# Patient Record
Sex: FEMALE | Race: WHITE | NOT HISPANIC OR LATINO | URBAN - NONMETROPOLITAN AREA
[De-identification: names, ages, dates, MRNs, and addresses within clinical notes are randomized per-mention and may not be internally consistent; named-entity substitution may affect disease eponyms.]

---

## 2023-02-17 ENCOUNTER — DISEASE STATE MANAGEMENT VISIT (OUTPATIENT)
Dept: PHARMACY | Facility: HOSPITAL | Age: 33
End: 2023-02-17
Payer: MEDICAID

## 2023-02-17 VITALS
WEIGHT: 203.6 LBS | TEMPERATURE: 98.4 F | OXYGEN SATURATION: 98 % | DIASTOLIC BLOOD PRESSURE: 83 MMHG | SYSTOLIC BLOOD PRESSURE: 111 MMHG | HEART RATE: 99 BPM

## 2023-02-17 DIAGNOSIS — B18.2 CHRONIC HEPATITIS C WITHOUT HEPATIC COMA: Primary | ICD-10-CM

## 2023-02-17 DIAGNOSIS — R74.8 ELEVATED LIVER ENZYMES: ICD-10-CM

## 2023-02-17 DIAGNOSIS — F19.91 HISTORY OF ILLICIT DRUG USE: ICD-10-CM

## 2023-02-17 PROCEDURE — 99204 OFFICE O/P NEW MOD 45 MIN: CPT | Performed by: PHYSICIAN ASSISTANT

## 2023-02-17 RX ORDER — ALPRAZOLAM 1 MG/1
1 TABLET ORAL NIGHTLY PRN
COMMUNITY
Start: 2022-09-01

## 2023-02-17 RX ORDER — DEXTROAMPHETAMINE SACCHARATE, AMPHETAMINE ASPARTATE, DEXTROAMPHETAMINE SULFATE AND AMPHETAMINE SULFATE 5; 5; 5; 5 MG/1; MG/1; MG/1; MG/1
20 TABLET ORAL DAILY
COMMUNITY
Start: 2023-02-15

## 2023-02-17 RX ORDER — CARIPRAZINE 4.5 MG/1
4.5 CAPSULE, GELATIN COATED ORAL DAILY
COMMUNITY
Start: 2023-01-20

## 2023-02-17 NOTE — PROGRESS NOTES
New Patient Consult      Date: 2023   Patient Name: Selma Florez  MRN: [id number]  : 1990     Primary Care Provider: Provider, No Known  Referring Provider: Referring    Chief Complaint   Patient presents with   • Hepatitis C     Pt here for initial Hep C eval     History of Present Illness: Selma Florez is a 32 y.o. female who is here today as a new patient (self-referral) with Gastroenterology for evaluation of Hepatitis C.    She found out about having Hepatitis C infection approx 3 years ago. She has not had prior treatment for hepatitis. Reports no known personal history of liver disease including other viral hepatitis. There is no known family history of liver disease or cirrhosis. She admits to previous IVDU and intranasal drug use. Has not used IV drugs in years. Recently did use cocaine and candido, intranasal which she does not do often. She does have nonprofessional tattoos. Admits to having previous alcoholism, drank very heavy daily for a few months. She does currently drink alcohol, drinks 1-3 beers occasionally. She does use marijuana intermittently. No recent liver imaging. She has not had recent labs. She has not had previous vaccinations for Hepatitis A and B. She is working full time and living with friends. She does have 2 children and her ex  has full custody.     Subjective      Past Medical History:   Diagnosis Date   • Coma (HCC)      or      Past Surgical History:   Procedure Laterality Date   • APPENDECTOMY     • ENDOMETRIAL ABLATION      age 14   • VAGINAL HYSTERECTOMY      2019     Family History   Problem Relation Age of Onset   • Breast cancer Mother    • Heart disease Father      Social History     Socioeconomic History   • Marital status:    Tobacco Use   • Smoking status: Never   • Smokeless tobacco: Never   Vaping Use   • Vaping Use: Every day   • Substances: Nicotine, THC, Flavoring   • Devices: Disposable   Substance  and Sexual Activity   • Alcohol use: Yes     Comment: 1-3 drinks about 3-4 times a week   • Drug use: Yes     Types: Cocaine(coke), Marijuana     Comment: Montserrat - occassional use   • Sexual activity: Defer     Current Outpatient Medications:   •  ALPRAZolam (Xanax) 1 MG tablet, Take 1 mg by mouth At Night As Needed. 3-4 times week, Disp: , Rfl:   •  amphetamine-dextroamphetamine (ADDERALL) 20 MG tablet, Take 20 mg by mouth Daily., Disp: , Rfl:   •  Vraylar 4.5 MG capsule, Take 4.5 mg by mouth Daily., Disp: , Rfl:   •  CRANBERRY PO, Take 1 capsule by mouth Daily., Disp: , Rfl:   •  Multiple Vitamins-Minerals (WOMENS MULTI PO), Take 1 tablet by mouth Daily., Disp: , Rfl:     No Known Allergies    The following portions of the patient's history were reviewed and updated as appropriate: allergies, current medications, past family history, past medical history, past social history, past surgical history and problem list.    Objective     Physical Exam  Vitals reviewed.   Constitutional:       General: She is not in acute distress.     Appearance: Normal appearance. She is well-developed. She is not ill-appearing or diaphoretic.   HENT:      Head: Normocephalic and atraumatic.      Right Ear: External ear normal.      Left Ear: External ear normal.      Nose: Nose normal.      Mouth/Throat:      Comments: Wearing a mask  Eyes:      General: No scleral icterus.        Right eye: No discharge.         Left eye: No discharge.      Conjunctiva/sclera: Conjunctivae normal.   Neck:      Vascular: No JVD.   Cardiovascular:      Rate and Rhythm: Normal rate and regular rhythm.      Heart sounds: Normal heart sounds. No murmur heard.    No friction rub. No gallop.   Pulmonary:      Effort: Pulmonary effort is normal. No respiratory distress.      Breath sounds: Normal breath sounds. No wheezing or rales.   Chest:      Chest wall: No tenderness.   Abdominal:      General: Bowel sounds are normal. There is no distension  (generalized, mild).      Palpations: Abdomen is soft. There is no mass.      Tenderness: There is abdominal tenderness. There is no guarding.   Musculoskeletal:         General: No deformity. Normal range of motion.      Cervical back: Normal range of motion.   Skin:     General: Skin is warm and dry.      Findings: No erythema or rash.      Comments: Tattoos, piercings   Neurological:      Mental Status: She is alert and oriented to person, place, and time.      Coordination: Coordination normal.   Psychiatric:         Mood and Affect: Mood normal.         Behavior: Behavior normal.         Thought Content: Thought content normal.         Judgment: Judgment normal.       Vitals:    02/17/23 0900   BP: 111/83   Pulse: 99   Temp: 98.4 °F (36.9 °C)   SpO2: 98%   Weight: 92.4 kg (203 lb 9.6 oz)     Results Review:   I have reviewed the patient's new clinical and imaging results.    Labs 6/2022: Hep C antibody positive, Hep B surf antigen negative, HCV RNA quant 3,646,353, , , alk phos 78, bili 0.8, Hgb 15.1, , platelets 257,000.     CTAP 2015 normal liver    Assessment / Plan      1. Chronic hepatitis C without hepatic coma  2. Elevated liver enzymes  She has chronic Hepatitis C infection, genotype unknown, treatment naive, without suspected cirrhosis. Labs in June 2022 showed moderately elevated liver enzymes. She will need to have these labs re-checked and expect liver enzymes to improve after Hepatitis C treatment. If they continue to be elevated, may need further liver workup with more labs after treatment.     More recommendations will be made after these results have been reviewed. She will abstain from alcohol and illegal drugs. She will have US liver to determine if any lesions or other liver diseases present. Immunity to Hep A and B will be determined and vaccinations recommended if needed. After review of these results and discussion with with the patient, we will proceed with Hep C  therapy and will submit Rx to insurance company for approval. Treatment will depend on fibrosis score and Hep C genotype. When approved, she will  Rx from our pharmacy and start taking exactly as directed. Hep C viral load lab (HCV RNA quant) and CMP will be checked 4 weeks after start of therapy, at end of therapy and 3 months s/p therapy to determine response to treatment and cure. She will call with concerns.     - CBC (No Diff); Future  - Comprehensive Metabolic Panel; Future  - hCG, Serum, Qualitative; Future  - HCV FibroSURE; Future  - HCV RNA By PCR, Qn Rfx [place]; Future  - Hepatitis A Antibody, Total; Future  - Hepatitis B Core Antibody, Total; Future  - Hepatitis B Surface Antibody; Future  - Hepatitis B Surface Antigen; Future  - HIV-1 / O / 2 Ag / Antibody 4th Generation; Future  - Protime-INR; Future    - US Liver; Future    3. History of illicit drug use  She admits previous and current illicit drug use. Has not used IV drugs in years per her report. She does intermittently continue to use drugs intranasally but not often. She understands that continuing these risky behaviors puts her at risk for reinfection with Hepatitis C even after treatment. She complete UDS prior to initiation of any Hepatitis C treatment, will have UDS when she is planning to proceed with treatment.     - Urine Drug Screen - Urine, Clean Catch; Future              Follow Up:   Return in about 6 weeks (around 3/31/2023) for discussion of results.      Yulia Lundberg PA-C  [facility]  2/17/2023  11:25 EST    Dictated Utilizing Dragon Dictation: Part of this note may be an electronic transcription/translation of spoken language to printed text using the Dragon Dictation System.

## 2024-09-06 ENCOUNTER — TELEPHONE (OUTPATIENT)
Dept: GASTROENTEROLOGY | Facility: CLINIC | Age: 34
End: 2024-09-06
Payer: COMMERCIAL

## 2024-09-06 NOTE — TELEPHONE ENCOUNTER
Hub staff attempted to follow warm transfer process and was unsuccessful     Caller: Selma Florez    Relationship to patient: Self    Best call back number: [phone]    Patient is needing: PT CALLED STATING THAT SHE NEEDS AN APPT FOR THE REMAINDER OF HER HEP C TREATMENT// PLEASE CALL PT BACK

## 2024-10-12 ENCOUNTER — OFFICE VISIT (OUTPATIENT)
Dept: INTERNAL MEDICINE | Facility: CLINIC | Age: 34
End: 2024-10-12
Payer: COMMERCIAL

## 2024-10-12 VITALS
OXYGEN SATURATION: 100 % | RESPIRATION RATE: 20 BRPM | WEIGHT: 223 LBS | HEIGHT: 66 IN | SYSTOLIC BLOOD PRESSURE: 114 MMHG | TEMPERATURE: 98.2 F | HEART RATE: 88 BPM | DIASTOLIC BLOOD PRESSURE: 81 MMHG | BODY MASS INDEX: 35.84 KG/M2

## 2024-10-12 DIAGNOSIS — F42.9 OBSESSIVE-COMPULSIVE DISORDER, UNSPECIFIED TYPE: ICD-10-CM

## 2024-10-12 DIAGNOSIS — R53.83 FATIGUE, UNSPECIFIED TYPE: ICD-10-CM

## 2024-10-12 DIAGNOSIS — L73.9 FOLLICULITIS: ICD-10-CM

## 2024-10-12 DIAGNOSIS — Z72.0 TOBACCO USE: ICD-10-CM

## 2024-10-12 DIAGNOSIS — Z76.89 ENCOUNTER TO ESTABLISH CARE: ICD-10-CM

## 2024-10-12 DIAGNOSIS — F32.A CHRONIC DEPRESSION: ICD-10-CM

## 2024-10-12 DIAGNOSIS — F41.0 PANIC DISORDER: ICD-10-CM

## 2024-10-12 DIAGNOSIS — Z13.29 SCREENING FOR ENDOCRINE, METABOLIC AND IMMUNITY DISORDER: ICD-10-CM

## 2024-10-12 DIAGNOSIS — Z13.220 SCREENING FOR CHOLESTEROL LEVEL: ICD-10-CM

## 2024-10-12 DIAGNOSIS — L60.8 TOENAIL DEFORMITY: ICD-10-CM

## 2024-10-12 DIAGNOSIS — N60.12 FIBROCYSTIC BREAST CHANGES, BILATERAL: ICD-10-CM

## 2024-10-12 DIAGNOSIS — N60.11 FIBROCYSTIC BREAST CHANGES, BILATERAL: ICD-10-CM

## 2024-10-12 DIAGNOSIS — Z13.0 SCREENING FOR ENDOCRINE, METABOLIC AND IMMUNITY DISORDER: ICD-10-CM

## 2024-10-12 DIAGNOSIS — Z12.72 ENCOUNTER FOR PAPANICOLAOU SMEAR OF VAGINA: ICD-10-CM

## 2024-10-12 DIAGNOSIS — K59.09 CHRONIC CONSTIPATION: ICD-10-CM

## 2024-10-12 DIAGNOSIS — J45.40 MODERATE PERSISTENT ASTHMA WITHOUT COMPLICATION: ICD-10-CM

## 2024-10-12 DIAGNOSIS — Z13.0 SCREENING FOR DEFICIENCY ANEMIA: ICD-10-CM

## 2024-10-12 DIAGNOSIS — Z80.3 FAMILY HISTORY OF BREAST CANCER: ICD-10-CM

## 2024-10-12 DIAGNOSIS — Z00.00 ANNUAL PHYSICAL EXAM: Primary | ICD-10-CM

## 2024-10-12 DIAGNOSIS — Z13.228 SCREENING FOR ENDOCRINE, METABOLIC AND IMMUNITY DISORDER: ICD-10-CM

## 2024-10-12 DIAGNOSIS — F90.2 ATTENTION DEFICIT HYPERACTIVITY DISORDER (ADHD), COMBINED TYPE: ICD-10-CM

## 2024-10-12 DIAGNOSIS — Z90.711 HISTORY OF PARTIAL HYSTERECTOMY: ICD-10-CM

## 2024-10-12 RX ORDER — LISDEXAMFETAMINE DIMESYLATE 60 MG/1
60 CAPSULE ORAL EVERY MORNING
COMMUNITY

## 2024-10-12 RX ORDER — MONTELUKAST SODIUM 10 MG/1
10 TABLET ORAL NIGHTLY
Qty: 90 TABLET | Refills: 3 | Status: SHIPPED | OUTPATIENT
Start: 2024-10-12

## 2024-10-12 RX ORDER — ALBUTEROL SULFATE 90 UG/1
2 INHALANT RESPIRATORY (INHALATION) EVERY 6 HOURS PRN
Qty: 18 G | Refills: 5 | Status: SHIPPED | OUTPATIENT
Start: 2024-10-12

## 2024-10-12 RX ORDER — MUPIROCIN 20 MG/G
1 OINTMENT TOPICAL 2 TIMES DAILY
Qty: 30 G | Refills: 0 | Status: SHIPPED | OUTPATIENT
Start: 2024-10-12

## 2024-10-12 RX ORDER — FLUTICASONE PROPIONATE AND SALMETEROL 100; 50 UG/1; UG/1
1 POWDER RESPIRATORY (INHALATION)
Qty: 60 EACH | Refills: 5 | Status: SHIPPED | OUTPATIENT
Start: 2024-10-12

## 2024-10-12 RX ORDER — CHLORHEXIDINE GLUCONATE 40 MG/ML
1 SOLUTION TOPICAL DAILY PRN
Qty: 3785 ML | Refills: 5 | Status: SHIPPED | OUTPATIENT
Start: 2024-10-12

## 2024-10-12 RX ORDER — NALTREXONE 380 MG
380 KIT INTRAMUSCULAR
COMMUNITY
Start: 2024-09-06
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
## 2024-10-16 LAB — REF LAB TEST METHOD: NORMAL

## 2024-10-17 ENCOUNTER — PRIOR AUTHORIZATION (OUTPATIENT)
Dept: INTERNAL MEDICINE | Facility: CLINIC | Age: 34
End: 2024-10-17
Payer: COMMERCIAL

## 2024-10-17 DIAGNOSIS — K58.1 IRRITABLE BOWEL SYNDROME WITH CONSTIPATION: ICD-10-CM

## 2024-10-17 DIAGNOSIS — K59.09 CHRONIC CONSTIPATION: Primary | ICD-10-CM

## 2024-10-17 RX ORDER — PLECANATIDE 3 MG/1
3 TABLET ORAL DAILY
Qty: 30 TABLET | Refills: 0 | Status: SHIPPED | OUTPATIENT
Start: 2024-10-17

## 2024-10-17 NOTE — TELEPHONE ENCOUNTER
PA submitted via CoverMyMeds on Linzess 72 MCG. (Key: [id number])      Has tried:    Miralax  Metamucil  Dulcolax   Magnesium citrate  Suppositories

## 2024-10-18 ENCOUNTER — PATIENT MESSAGE (OUTPATIENT)
Dept: INTERNAL MEDICINE | Facility: CLINIC | Age: 34
End: 2024-10-18
Payer: COMMERCIAL

## 2024-10-18 DIAGNOSIS — F19.10 SUBSTANCE ABUSE: Primary | ICD-10-CM

## 2024-10-18 DIAGNOSIS — Z02.83 ENCOUNTER FOR DRUG SCREENING: ICD-10-CM

## 2024-10-18 DIAGNOSIS — F19.11 HISTORY OF DRUG ABUSE: ICD-10-CM

## 2024-10-21 DIAGNOSIS — F19.11 HISTORY OF DRUG ABUSE: Primary | ICD-10-CM

## 2024-10-21 DIAGNOSIS — Z51.81 MEDICATION MONITORING ENCOUNTER: ICD-10-CM

## 2024-10-22 ENCOUNTER — CLINICAL SUPPORT (OUTPATIENT)
Dept: INTERNAL MEDICINE | Facility: CLINIC | Age: 34
End: 2024-10-22
Payer: COMMERCIAL

## 2024-10-22 DIAGNOSIS — F19.10 SUBSTANCE ABUSE: ICD-10-CM

## 2024-10-22 DIAGNOSIS — F19.11 HISTORY OF DRUG ABUSE: ICD-10-CM

## 2024-10-22 DIAGNOSIS — Z02.83 ENCOUNTER FOR DRUG SCREENING: ICD-10-CM

## 2024-10-23 LAB
25(OH)D3+25(OH)D2 SERPL-MCNC: 61.7 NG/ML (ref 30–100)
ALBUMIN SERPL-MCNC: 4 G/DL (ref 3.5–5.2)
ALBUMIN/GLOB SERPL: 1.3 G/DL
ALP SERPL-CCNC: 100 U/L (ref 39–117)
ALT SERPL-CCNC: 191 U/L (ref 1–33)
AMPHETAMINES UR QL SCN: POSITIVE NG/ML
AST SERPL-CCNC: 119 U/L (ref 1–32)
BARBITURATES UR QL SCN: NEGATIVE NG/ML
BENZODIAZ UR QL SCN: POSITIVE NG/ML
BILIRUB SERPL-MCNC: 0.4 MG/DL (ref 0–1.2)
BUN SERPL-MCNC: 9 MG/DL (ref 6–20)
BUN/CREAT SERPL: 11.5 (ref 7–25)
BZE UR QL SCN: NEGATIVE NG/ML
CALCIUM SERPL-MCNC: 9 MG/DL (ref 8.6–10.5)
CANNABINOIDS UR QL SCN: NEGATIVE NG/ML
CHLORIDE SERPL-SCNC: 102 MMOL/L (ref 98–107)
CHOLEST SERPL-MCNC: 146 MG/DL (ref 0–200)
CO2 SERPL-SCNC: 21.1 MMOL/L (ref 22–29)
CREAT SERPL-MCNC: 0.78 MG/DL (ref 0.57–1)
CREAT UR-MCNC: 20 MG/DL (ref 20–300)
EGFRCR SERPLBLD CKD-EPI 2021: 102.4 ML/MIN/1.73
ERYTHROCYTE [DISTWIDTH] IN BLOOD BY AUTOMATED COUNT: 11.7 % (ref 12.3–15.4)
FOLATE SERPL-MCNC: 18.8 NG/ML (ref 4.78–24.2)
GLOBULIN SER CALC-MCNC: 3 GM/DL
GLUCOSE SERPL-MCNC: 74 MG/DL (ref 65–99)
HBA1C MFR BLD: 4.8 % (ref 4.8–5.6)
HCT VFR BLD AUTO: 40.9 % (ref 34–46.6)
HDLC SERPL-MCNC: 38 MG/DL (ref 40–60)
HGB BLD-MCNC: 13.9 G/DL (ref 12–15.9)
LABORATORY COMMENT REPORT: ABNORMAL
LDLC SERPL CALC-MCNC: 85 MG/DL (ref 0–100)
MCH RBC QN AUTO: 32.6 PG (ref 26.6–33)
MCHC RBC AUTO-ENTMCNC: 34 G/DL (ref 31.5–35.7)
MCV RBC AUTO: 96 FL (ref 79–97)
METHADONE UR QL SCN: NEGATIVE NG/ML
OPIATES UR QL SCN: NEGATIVE NG/ML
OXYCODONE+OXYMORPHONE UR QL SCN: NEGATIVE NG/ML
PCP UR QL: NEGATIVE NG/ML
PH UR: 5.7 [PH] (ref 4.5–8.9)
PLATELET # BLD AUTO: 246 10*3/MM3 (ref 140–450)
POTASSIUM SERPL-SCNC: 4.2 MMOL/L (ref 3.5–5.2)
PROPOXYPH UR QL SCN: NEGATIVE NG/ML
PROT SERPL-MCNC: 7 G/DL (ref 6–8.5)
RBC # BLD AUTO: 4.26 10*6/MM3 (ref 3.77–5.28)
SODIUM SERPL-SCNC: 139 MMOL/L (ref 136–145)
TRIGL SERPL-MCNC: 127 MG/DL (ref 0–150)
TSH SERPL DL<=0.005 MIU/L-ACNC: 1.8 UIU/ML (ref 0.27–4.2)
VIT B12 SERPL-MCNC: 471 PG/ML (ref 211–946)
VLDLC SERPL CALC-MCNC: 23 MG/DL (ref 5–40)
WBC # BLD AUTO: 6.79 10*3/MM3 (ref 3.4–10.8)

## 2024-10-24 DIAGNOSIS — B18.2 CHRONIC HEPATITIS C WITHOUT HEPATIC COMA: Primary | ICD-10-CM

## 2024-10-31 ENCOUNTER — CLINICAL SUPPORT (OUTPATIENT)
Dept: INTERNAL MEDICINE | Facility: CLINIC | Age: 34
End: 2024-10-31
Payer: COMMERCIAL

## 2024-11-01 LAB
AMPHETAMINES UR QL SCN: POSITIVE NG/ML
BARBITURATES UR QL SCN: NEGATIVE NG/ML
BENZODIAZ UR QL SCN: POSITIVE NG/ML
BZE UR QL SCN: NEGATIVE NG/ML
CANNABINOIDS UR QL SCN: NEGATIVE NG/ML
CREAT UR-MCNC: 35.5 MG/DL (ref 20–300)
LABORATORY COMMENT REPORT: ABNORMAL
METHADONE UR QL SCN: NEGATIVE NG/ML
OPIATES UR QL SCN: NEGATIVE NG/ML
OXYCODONE+OXYMORPHONE UR QL SCN: NEGATIVE NG/ML
PCP UR QL: NEGATIVE NG/ML
PH UR: 5.7 [PH] (ref 4.5–8.9)
PROPOXYPH UR QL SCN: NEGATIVE NG/ML

## 2024-11-07 ENCOUNTER — CLINICAL SUPPORT (OUTPATIENT)
Dept: INTERNAL MEDICINE | Facility: CLINIC | Age: 34
End: 2024-11-07
Payer: COMMERCIAL

## 2024-11-08 ENCOUNTER — LAB (OUTPATIENT)
Dept: LAB | Facility: HOSPITAL | Age: 34
End: 2024-11-08
Payer: COMMERCIAL

## 2024-11-08 ENCOUNTER — SPECIALTY PHARMACY (OUTPATIENT)
Dept: PHARMACY | Facility: HOSPITAL | Age: 34
End: 2024-11-08
Payer: COMMERCIAL

## 2024-11-08 VITALS
WEIGHT: 231 LBS | SYSTOLIC BLOOD PRESSURE: 130 MMHG | DIASTOLIC BLOOD PRESSURE: 89 MMHG | BODY MASS INDEX: 37.28 KG/M2 | OXYGEN SATURATION: 96 % | HEART RATE: 95 BPM

## 2024-11-08 DIAGNOSIS — R74.8 ELEVATED LIVER ENZYMES: ICD-10-CM

## 2024-11-08 DIAGNOSIS — B18.2 CHRONIC HEPATITIS C WITHOUT HEPATIC COMA: ICD-10-CM

## 2024-11-08 DIAGNOSIS — F19.91 HISTORY OF ILLICIT DRUG USE: ICD-10-CM

## 2024-11-08 DIAGNOSIS — B18.2 CHRONIC HEPATITIS C WITHOUT HEPATIC COMA: Primary | ICD-10-CM

## 2024-11-08 LAB
ALBUMIN SERPL-MCNC: 4.2 G/DL (ref 3.5–5.2)
ALBUMIN/GLOB SERPL: 1.4 G/DL
ALP SERPL-CCNC: 84 U/L (ref 39–117)
ALT SERPL W P-5'-P-CCNC: 232 U/L (ref 1–33)
AMPHETAMINES UR QL SCN: POSITIVE NG/ML
ANION GAP SERPL CALCULATED.3IONS-SCNC: 11.5 MMOL/L (ref 5–15)
AST SERPL-CCNC: 111 U/L (ref 1–32)
BARBITURATES UR QL SCN: NEGATIVE NG/ML
BENZODIAZ UR QL SCN: POSITIVE NG/ML
BILIRUB SERPL-MCNC: 0.3 MG/DL (ref 0–1.2)
BUN SERPL-MCNC: 12 MG/DL (ref 6–20)
BUN/CREAT SERPL: 14.8 (ref 7–25)
BZE UR QL SCN: NEGATIVE NG/ML
CALCIUM SPEC-SCNC: 10.6 MG/DL (ref 8.6–10.5)
CANNABINOIDS UR QL SCN: NEGATIVE NG/ML
CHLORIDE SERPL-SCNC: 102 MMOL/L (ref 98–107)
CO2 SERPL-SCNC: 25.5 MMOL/L (ref 22–29)
CREAT SERPL-MCNC: 0.81 MG/DL (ref 0.57–1)
CREAT UR-MCNC: 42.2 MG/DL (ref 20–300)
EGFRCR SERPLBLD CKD-EPI 2021: 97.8 ML/MIN/1.73
GLOBULIN UR ELPH-MCNC: 2.9 GM/DL
GLUCOSE SERPL-MCNC: 83 MG/DL (ref 65–99)
HBV SURFACE AB SER RIA-ACNC: NORMAL
HBV SURFACE AG SERPL QL IA: NORMAL
HIV 1+2 AB+HIV1P24 AG SERPLBLD IA.RAPID: NORMAL
HIV 1+2 AB+HIV1P24 AG SERPLBLD IA.RAPID: NORMAL
INR PPP: 0.89 (ref 0.9–1.1)
LABORATORY COMMENT REPORT: ABNORMAL
METHADONE UR QL SCN: NEGATIVE NG/ML
OPIATES UR QL SCN: NEGATIVE NG/ML
OXYCODONE+OXYMORPHONE UR QL SCN: NEGATIVE NG/ML
PCP UR QL: NEGATIVE NG/ML
PH UR: 6.6 [PH] (ref 4.5–8.9)
POTASSIUM SERPL-SCNC: 4.7 MMOL/L (ref 3.5–5.2)
PROPOXYPH UR QL SCN: NEGATIVE NG/ML
PROT SERPL-MCNC: 7.1 G/DL (ref 6–8.5)
PROTHROMBIN TIME: 12.5 SECONDS (ref 12.3–15.1)
SODIUM SERPL-SCNC: 139 MMOL/L (ref 136–145)

## 2024-11-08 PROCEDURE — 85610 PROTHROMBIN TIME: CPT

## 2024-11-08 PROCEDURE — 87522 HEPATITIS C REVRS TRNSCRPJ: CPT

## 2024-11-08 PROCEDURE — 87340 HEPATITIS B SURFACE AG IA: CPT

## 2024-11-08 PROCEDURE — 86706 HEP B SURFACE ANTIBODY: CPT

## 2024-11-08 PROCEDURE — 36415 COLL VENOUS BLD VENIPUNCTURE: CPT

## 2024-11-08 PROCEDURE — 86708 HEPATITIS A ANTIBODY: CPT

## 2024-11-08 PROCEDURE — G0475 HIV COMBINATION ASSAY: HCPCS

## 2024-11-08 PROCEDURE — 87902 NFCT AGT GNTYP ALYS HEP C: CPT

## 2024-11-08 PROCEDURE — 86704 HEP B CORE ANTIBODY TOTAL: CPT

## 2024-11-08 PROCEDURE — G0463 HOSPITAL OUTPT CLINIC VISIT: HCPCS

## 2024-11-08 PROCEDURE — 80053 COMPREHEN METABOLIC PANEL: CPT

## 2024-11-08 NOTE — LETTER
2024     TRISTON Casillas  [address]  [facility] [address]    Patient: Selma Florez   YOB: 1990   Date of Visit: 2024       Dear TRISTON Casillas    Selma Florez was in my office today. Below is a copy of my note.    If you have questions, please do not hesitate to call me. I look forward to following Selma along with you.         Sincerely,        [facility]        CC: No Recipients         Follow Up Note     Date: 2024   Patient Name: Selma Florez  MRN: [id number]  : 1990     Primary Care Provider: Porsche Talavera APRN     Chief Complaint   Patient presents with   • Hepatitis C     Pt here for initial Hep C eval     History of present illness:   2024  Selma Florez is a 34 y.o. female who is here today as an established GI patient regarding Hepatitis C.    She started Hep C treatment with Epclusa from Aug 2024 and took the medication for 5 weeks. She was not able to complete treatment when she left the rehab. She had severe migraines and vomiting during the treatment. Has been clean now since 2024. She is on vivotrol now.     Feeling well today without GI complaints. Takes Linzess 72 mcg once daily for constipation with good improvements.     Interval History:  2023  She found out about having Hepatitis C infection approx 3 years ago. She has not had prior treatment for hepatitis. Reports no known personal history of liver disease including other viral hepatitis. There is no known family history of liver disease or cirrhosis. She admits to previous IVDU and intranasal drug use. Has not used IV drugs in years. Recently did use cocaine and candido, intranasal which she does not do often. She does have nonprofessional tattoos. Admits to having previous alcoholism, drank very heavy daily for a few months. She does currently drink alcohol, drinks 1-3 beers occasionally. She does use  marijuana intermittently. No recent liver imaging. She has not had recent labs. She has not had previous vaccinations for Hepatitis A and B. She is working full time and living with friends. She does have 2 children and her ex  has full custody.     Subjective     Past Medical History:   Diagnosis Date   • ADHD    • Coma     2020 or 2021   • Hepatitis C    • OCD (obsessive compulsive disorder)    • Panic disorder      Past Surgical History:   Procedure Laterality Date   • APPENDECTOMY     • ENDOMETRIAL ABLATION      age 14   • VAGINAL HYSTERECTOMY      2019     Family History   Problem Relation Age of Onset   • Breast cancer Mother    • Heart disease Father      Social History     Socioeconomic History   • Marital status:    Tobacco Use   • Smoking status: Every Day     Types: Cigarettes   • Smokeless tobacco: Never   • Tobacco comments:     1-2 cigarettes a day   Vaping Use   • Vaping status: Some Days   • Substances: Nicotine, Flavoring   • Devices: Disposable   Substance and Sexual Activity   • Alcohol use: Not Currently   • Drug use: Not Currently     Types: Cocaine(coke), Marijuana, Methamphetamines     Comment: Montserrat - occassional use   • Sexual activity: Defer     Current Outpatient Medications:   •  albuterol sulfate  (90 Base) MCG/ACT inhaler, Inhale 2 puffs Every 6 (Six) Hours As Needed for Wheezing or Shortness of Air., Disp: 18 g, Rfl: 5  •  ALPRAZolam (Xanax) 1 MG tablet, Take 1 tablet by mouth At Night As Needed. 3-4 times week (Patient taking differently: Take 1 tablet by mouth 3 times a day.), Disp: , Rfl:   •  amphetamine-dextroamphetamine (ADDERALL) 20 MG tablet, Take 1 tablet by mouth Daily., Disp: , Rfl:   •  Chlorhexidine Gluconate 4 % solution, Apply 1 Application topically Daily As Needed (folliculitis)., Disp: 3785 mL, Rfl: 5  •  Fluticasone-Salmeterol (Advair Diskus) 100-50 MCG/ACT DISKUS, Inhale 1 puff 2 (Two) Times a Day., Disp: 60 each, Rfl: 5  •  lisdexamfetamine  (Vyvanse) 60 MG capsule, Take 1 capsule by mouth Every Morning, Disp: , Rfl:   •  Multiple Vitamins-Minerals (WOMENS MULTI PO), Take 1 tablet by mouth Daily., Disp: , Rfl:   •  Vivitrol 380 MG reconstituted suspension IM suspension, Inject 380 mg into the appropriate muscle as directed by prescriber Every 30 (Thirty) Days., Disp: , Rfl:   •  Vraylar 4.5 MG capsule, Take 1 capsule by mouth Daily. (Patient taking differently: Take 1.5 mg by mouth Daily.), Disp: , Rfl:   •  CRANBERRY PO, Take 1 capsule by mouth Daily. (Patient not taking: Reported on 11/8/2024), Disp: , Rfl:   •  linaclotide (LINZESS) 72 MCG capsule capsule, Take 1 capsule by mouth Every Morning Before Breakfast., Disp: 30 capsule, Rfl: 0    No Known Allergies    The following portions of the patient's history were reviewed and updated as appropriate: allergies, current medications, past family history, past medical history, past social history, past surgical history and problem list.    Objective     Physical Exam  Constitutional:       General: She is not in acute distress.     Appearance: Normal appearance. She is well-developed. She is not diaphoretic.   HENT:      Head: Normocephalic and atraumatic.      Right Ear: External ear normal.      Left Ear: External ear normal.      Nose: Nose normal.   Eyes:      General: No scleral icterus.        Right eye: No discharge.         Left eye: No discharge.      Conjunctiva/sclera: Conjunctivae normal.   Neck:      Trachea: No tracheal deviation.   Pulmonary:      Effort: Pulmonary effort is normal. No respiratory distress.   Musculoskeletal:         General: Normal range of motion.      Cervical back: Normal range of motion.   Skin:     Coloration: Skin is not pale.      Findings: No erythema or rash.   Neurological:      Mental Status: She is alert and oriented to person, place, and time.      Coordination: Coordination normal.   Psychiatric:         Mood and Affect: Mood normal.         Behavior:  Behavior normal.         Thought Content: Thought content normal.         Judgment: Judgment normal.       Vitals:    11/08/24 0952   BP: 130/89   Pulse: 95   SpO2: 96%   Weight: 105 kg (231 lb)     Results Review:   I reviewed the patient's new clinical results.    Office Visit on 10/12/2024   Component Date Value Ref Range Status   • WBC 10/22/2024 6.79  3.40 - 10.80 10*3/mm3 Final   • RBC 10/22/2024 4.26  3.77 - 5.28 10*6/mm3 Final   • Hemoglobin 10/22/2024 13.9  12.0 - 15.9 g/dL Final   • Hematocrit 10/22/2024 40.9  34.0 - 46.6 % Final   • MCV 10/22/2024 96.0  79.0 - 97.0 fL Final   • MCH 10/22/2024 32.6  26.6 - 33.0 pg Final   • MCHC 10/22/2024 34.0  31.5 - 35.7 g/dL Final   • RDW 10/22/2024 11.7 (L)  12.3 - 15.4 % Final   • Platelets 10/22/2024 246  140 - 450 10*3/mm3 Final   • Glucose 10/22/2024 74  65 - 99 mg/dL Final   • BUN 10/22/2024 9  6 - 20 mg/dL Final   • Creatinine 10/22/2024 0.78  0.57 - 1.00 mg/dL Final   • EGFR Result 10/22/2024 102.4  >60.0 mL/min/1.73 Final    Comment: GFR Normal >60  Chronic Kidney Disease <60  Kidney Failure <15     • BUN/Creatinine Ratio 10/22/2024 11.5  7.0 - 25.0 Final   • Sodium 10/22/2024 139  136 - 145 mmol/L Final   • Potassium 10/22/2024 4.2  3.5 - 5.2 mmol/L Final   • Chloride 10/22/2024 102  98 - 107 mmol/L Final   • Total CO2 10/22/2024 21.1 (L)  22.0 - 29.0 mmol/L Final   • Calcium 10/22/2024 9.0  8.6 - 10.5 mg/dL Final   • Total Protein 10/22/2024 7.0  6.0 - 8.5 g/dL Final   • Albumin 10/22/2024 4.0  3.5 - 5.2 g/dL Final   • Globulin 10/22/2024 3.0  gm/dL Final   • A/G Ratio 10/22/2024 1.3  g/dL Final   • Total Bilirubin 10/22/2024 0.4  0.0 - 1.2 mg/dL Final   • Alkaline Phosphatase 10/22/2024 100  39 - 117 U/L Final   • AST (SGOT) 10/22/2024 119 (H)  1 - 32 U/L Final   • ALT (SGPT) 10/22/2024 191 (H)  1 - 33 U/L Final   • Total Cholesterol 10/22/2024 146  0 - 200 mg/dL Final   • Triglycerides 10/22/2024 127  0 - 150 mg/dL Final   • HDL Cholesterol 10/22/2024 38  (L)  40 - 60 mg/dL Final   • VLDL Cholesterol Mathew 10/22/2024 23  5 - 40 mg/dL Final   • LDL Chol Calc ([facility]) 10/22/2024 85  0 - 100 mg/dL Final   • TSH 10/22/2024 1.800  0.270 - 4.200 uIU/mL Final   • Hemoglobin A1C 10/22/2024 4.80  4.80 - 5.60 % Final      Labs 6/2022: Hep C antibody positive, Hep B surf antigen negative, HCV RNA quant 3,646,353, , , alk phos 78, bili 0.8, Hgb 15.1, , platelets 257,000.      CTAP 2015 normal liver    No recent abdominal imaging to review.      Assessment / Plan      1. Chronic hepatitis C without hepatic coma  2. History of illicit drug use  3. Elevated liver enzymes  She has chronic Hepatitis C infection, genotype unknown, treatment experienced (took Epclusa 1 tab PO once daily x 5 weeks in July 2024), without suspected cirrhosis. Labs in June 2022 and again recently 10/2024 show moderately elevated liver enzymes. She will need to have these labs re-checked and expect liver enzymes to improve after Hepatitis C treatment. If they continue to be elevated, may need further liver workup with more labs after treatment.      More recommendations will be made after these results have been reviewed. She will abstain from alcohol and illegal drugs. Immunity to Hep A and B will be determined and vaccinations recommended if needed. After review of these results and discussion with with the patient, we will proceed with Hep C therapy and will submit Rx to insurance company for approval. Treatment will depend on fibrosis score and Hep C genotype. When approved, she will  Rx from our pharmacy and start taking exactly as directed. Hep C viral load lab (HCV RNA quant) and CMP will be checked 4 weeks after start of therapy, at end of therapy and 3 months s/p therapy to determine response to treatment and cure. She will call with concerns.     - HCV FibroSURE; Future  - HCV RNA By PCR, Qn Rfx [place]; Future  - Hepatitis A Antibody, Total; Future  - Hepatitis B Core  Antibody, Total; Future  - Hepatitis B Surface Antibody; Future  - Hepatitis B Surface Antigen; Future  - HIV-1 / O / 2 Ag / Antibody; Future  - Protime-INR; Future  - Comprehensive Metabolic Panel; Future    Follow Up:   Return in about 6 weeks (around 12/20/2024) for recheck Hepatitis C.    Yulia Lundberg PA-C  [facility]  11/8/2024  13:26 EST    Dictated Utilizing Dragon Dictation: Part of this note may be an electronic transcription/translation of spoken language to printed text using the Dragon Dictation System.

## 2024-11-08 NOTE — PROGRESS NOTES
Follow Up Note     Date: 2024   Patient Name: Selma Florez  MRN: [id number]  : 1990     Primary Care Provider: Porsche Talavera APRN     Chief Complaint   Patient presents with    Hepatitis C     Pt here for initial Hep C eval     History of present illness:   2024  Selma Florez is a 34 y.o. female who is here today as an established GI patient regarding Hepatitis C.    She started Hep C treatment with Epclusa from Aug 2024 and took the medication for 5 weeks. She was not able to complete treatment when she left the rehab. She had severe migraines and vomiting during the treatment. Has been clean now since 2024. She is on vivotrol now.     Feeling well today without GI complaints. Takes Linzess 72 mcg once daily for constipation with good improvements.     Interval History:  2023  She found out about having Hepatitis C infection approx 3 years ago. She has not had prior treatment for hepatitis. Reports no known personal history of liver disease including other viral hepatitis. There is no known family history of liver disease or cirrhosis. She admits to previous IVDU and intranasal drug use. Has not used IV drugs in years. Recently did use cocaine and candido, intranasal which she does not do often. She does have nonprofessional tattoos. Admits to having previous alcoholism, drank very heavy daily for a few months. She does currently drink alcohol, drinks 1-3 beers occasionally. She does use marijuana intermittently. No recent liver imaging. She has not had recent labs. She has not had previous vaccinations for Hepatitis A and B. She is working full time and living with friends. She does have 2 children and her ex  has full custody.     Subjective     Past Medical History:   Diagnosis Date    ADHD     Coma      or     Hepatitis C     OCD (obsessive compulsive disorder)     Panic disorder      Past Surgical History:   Procedure Laterality Date     APPENDECTOMY      ENDOMETRIAL ABLATION      age 14    VAGINAL HYSTERECTOMY      2019     Family History   Problem Relation Age of Onset    Breast cancer Mother     Heart disease Father      Social History     Socioeconomic History    Marital status:    Tobacco Use    Smoking status: Every Day     Types: Cigarettes    Smokeless tobacco: Never    Tobacco comments:     1-2 cigarettes a day   Vaping Use    Vaping status: Some Days    Substances: Nicotine, Flavoring    Devices: Disposable   Substance and Sexual Activity    Alcohol use: Not Currently    Drug use: Not Currently     Types: Cocaine(coke), Marijuana, Methamphetamines     Comment: Montserrat - occassional use    Sexual activity: Defer     Current Outpatient Medications:     albuterol sulfate  (90 Base) MCG/ACT inhaler, Inhale 2 puffs Every 6 (Six) Hours As Needed for Wheezing or Shortness of Air., Disp: 18 g, Rfl: 5    ALPRAZolam (Xanax) 1 MG tablet, Take 1 tablet by mouth At Night As Needed. 3-4 times week (Patient taking differently: Take 1 tablet by mouth 3 times a day.), Disp: , Rfl:     amphetamine-dextroamphetamine (ADDERALL) 20 MG tablet, Take 1 tablet by mouth Daily., Disp: , Rfl:     Chlorhexidine Gluconate 4 % solution, Apply 1 Application topically Daily As Needed (folliculitis)., Disp: 3785 mL, Rfl: 5    Fluticasone-Salmeterol (Advair Diskus) 100-50 MCG/ACT DISKUS, Inhale 1 puff 2 (Two) Times a Day., Disp: 60 each, Rfl: 5    lisdexamfetamine (Vyvanse) 60 MG capsule, Take 1 capsule by mouth Every Morning, Disp: , Rfl:     Multiple Vitamins-Minerals (WOMENS MULTI PO), Take 1 tablet by mouth Daily., Disp: , Rfl:     Vivitrol 380 MG reconstituted suspension IM suspension, Inject 380 mg into the appropriate muscle as directed by prescriber Every 30 (Thirty) Days., Disp: , Rfl:     Vraylar 4.5 MG capsule, Take 1 capsule by mouth Daily. (Patient taking differently: Take 1.5 mg by mouth Daily.), Disp: , Rfl:     CRANBERRY PO, Take 1 capsule by  mouth Daily. (Patient not taking: Reported on 11/8/2024), Disp: , Rfl:     linaclotide (LINZESS) 72 MCG capsule capsule, Take 1 capsule by mouth Every Morning Before Breakfast., Disp: 30 capsule, Rfl: 0    No Known Allergies    The following portions of the patient's history were reviewed and updated as appropriate: allergies, current medications, past family history, past medical history, past social history, past surgical history and problem list.    Objective     Physical Exam  Constitutional:       General: She is not in acute distress.     Appearance: Normal appearance. She is well-developed. She is not diaphoretic.   HENT:      Head: Normocephalic and atraumatic.      Right Ear: External ear normal.      Left Ear: External ear normal.      Nose: Nose normal.   Eyes:      General: No scleral icterus.        Right eye: No discharge.         Left eye: No discharge.      Conjunctiva/sclera: Conjunctivae normal.   Neck:      Trachea: No tracheal deviation.   Pulmonary:      Effort: Pulmonary effort is normal. No respiratory distress.   Musculoskeletal:         General: Normal range of motion.      Cervical back: Normal range of motion.   Skin:     Coloration: Skin is not pale.      Findings: No erythema or rash.   Neurological:      Mental Status: She is alert and oriented to person, place, and time.      Coordination: Coordination normal.   Psychiatric:         Mood and Affect: Mood normal.         Behavior: Behavior normal.         Thought Content: Thought content normal.         Judgment: Judgment normal.       Vitals:    11/08/24 0952   BP: 130/89   Pulse: 95   SpO2: 96%   Weight: 105 kg (231 lb)     Results Review:   I reviewed the patient's new clinical results.    Office Visit on 10/12/2024   Component Date Value Ref Range Status    WBC 10/22/2024 6.79  3.40 - 10.80 10*3/mm3 Final    RBC 10/22/2024 4.26  3.77 - 5.28 10*6/mm3 Final    Hemoglobin 10/22/2024 13.9  12.0 - 15.9 g/dL Final    Hematocrit 10/22/2024  40.9  34.0 - 46.6 % Final    MCV 10/22/2024 96.0  79.0 - 97.0 fL Final    MCH 10/22/2024 32.6  26.6 - 33.0 pg Final    MCHC 10/22/2024 34.0  31.5 - 35.7 g/dL Final    RDW 10/22/2024 11.7 (L)  12.3 - 15.4 % Final    Platelets 10/22/2024 246  140 - 450 10*3/mm3 Final    Glucose 10/22/2024 74  65 - 99 mg/dL Final    BUN 10/22/2024 9  6 - 20 mg/dL Final    Creatinine 10/22/2024 0.78  0.57 - 1.00 mg/dL Final    EGFR Result 10/22/2024 102.4  >60.0 mL/min/1.73 Final    Comment: GFR Normal >60  Chronic Kidney Disease <60  Kidney Failure <15      BUN/Creatinine Ratio 10/22/2024 11.5  7.0 - 25.0 Final    Sodium 10/22/2024 139  136 - 145 mmol/L Final    Potassium 10/22/2024 4.2  3.5 - 5.2 mmol/L Final    Chloride 10/22/2024 102  98 - 107 mmol/L Final    Total CO2 10/22/2024 21.1 (L)  22.0 - 29.0 mmol/L Final    Calcium 10/22/2024 9.0  8.6 - 10.5 mg/dL Final    Total Protein 10/22/2024 7.0  6.0 - 8.5 g/dL Final    Albumin 10/22/2024 4.0  3.5 - 5.2 g/dL Final    Globulin 10/22/2024 3.0  gm/dL Final    A/G Ratio 10/22/2024 1.3  g/dL Final    Total Bilirubin 10/22/2024 0.4  0.0 - 1.2 mg/dL Final    Alkaline Phosphatase 10/22/2024 100  39 - 117 U/L Final    AST (SGOT) 10/22/2024 119 (H)  1 - 32 U/L Final    ALT (SGPT) 10/22/2024 191 (H)  1 - 33 U/L Final    Total Cholesterol 10/22/2024 146  0 - 200 mg/dL Final    Triglycerides 10/22/2024 127  0 - 150 mg/dL Final    HDL Cholesterol 10/22/2024 38 (L)  40 - 60 mg/dL Final    VLDL Cholesterol Mathew 10/22/2024 23  5 - 40 mg/dL Final    LDL Chol Calc ([facility]) 10/22/2024 85  0 - 100 mg/dL Final    TSH 10/22/2024 1.800  0.270 - 4.200 uIU/mL Final    Hemoglobin A1C 10/22/2024 4.80  4.80 - 5.60 % Final      Labs 6/2022: Hep C antibody positive, Hep B surf antigen negative, HCV RNA quant 3,646,353, , , alk phos 78, bili 0.8, Hgb 15.1, , platelets 257,000.      CTAP 2015 normal liver    No recent abdominal imaging to review.      Assessment / Plan      1. Chronic hepatitis C  without hepatic coma  2. History of illicit drug use  3. Elevated liver enzymes  She has chronic Hepatitis C infection, genotype unknown, treatment experienced (took Epclusa 1 tab PO once daily x 5 weeks in Aug 2024), without suspected cirrhosis. Labs in June 2022 and again recently 10/2024 show moderately elevated liver enzymes. She will need to have these labs re-checked and expect liver enzymes to improve after Hepatitis C treatment. If they continue to be elevated, may need further liver workup with more labs after treatment.      More recommendations will be made after these results have been reviewed. She will abstain from alcohol and illegal drugs. Immunity to Hep A and B will be determined and vaccinations recommended if needed. After review of these results and discussion with with the patient, we will proceed with Hep C therapy and will submit Rx to insurance company for approval. Treatment will depend on fibrosis score and Hep C genotype. When approved, she will  Rx from our pharmacy and start taking exactly as directed. Hep C viral load lab (HCV RNA quant) and CMP will be checked 4 weeks after start of therapy, at end of therapy and 3 months s/p therapy to determine response to treatment and cure. She will call with concerns.     - HCV FibroSURE; Future  - HCV RNA By PCR, Qn Rfx [place]; Future  - Hepatitis A Antibody, Total; Future  - Hepatitis B Core Antibody, Total; Future  - Hepatitis B Surface Antibody; Future  - Hepatitis B Surface Antigen; Future  - HIV-1 / O / 2 Ag / Antibody; Future  - Protime-INR; Future  - Comprehensive Metabolic Panel; Future    Follow Up:   Return in about 6 weeks (around 12/20/2024) for recheck Hepatitis C.    Yulia Lundberg PA-C  [facility] [place]  11/8/2024  13:26 EST    Dictated Utilizing Dragon Dictation: Part of this note may be an electronic transcription/translation of spoken language to printed text using the Dragon Dictation System.

## 2024-11-09 LAB
HAV AB SER QL IA: NEGATIVE
HBV CORE AB SERPL QL IA: NEGATIVE

## 2024-11-10 LAB
A2 MACROGLOB SERPL-MCNC: 333 MG/DL (ref 110–276)
ALT SERPL W P-5'-P-CCNC: 311 IU/L (ref 0–40)
APO A-I SERPL-MCNC: 140 MG/DL (ref 116–209)
BILIRUB SERPL-MCNC: 0.3 MG/DL (ref 0–1.2)
FIBROSIS SCORING:: ABNORMAL
FIBROSIS STAGE SERPL QL: ABNORMAL
GGT SERPL-CCNC: 41 IU/L (ref 0–60)
HAPTOGLOB SERPL-MCNC: 78 MG/DL (ref 33–278)
HCV AB SER QL: ABNORMAL
LABORATORY COMMENT REPORT: ABNORMAL
LIVER FIBR SCORE SERPL CALC.FIBROSURE: 0.29 (ref 0–0.21)
NECROINFLAMM ACTIVITY SCORING:: ABNORMAL
NECROINFLAMMATORY ACT GRADE SERPL QL: ABNORMAL
NECROINFLAMMATORY ACT SCORE SERPL: 0.9 (ref 0–0.17)
SERVICE CMNT-IMP: ABNORMAL
TEST PERFORMANCE INFO SPEC: ABNORMAL

## 2024-11-12 ENCOUNTER — CLINICAL SUPPORT (OUTPATIENT)
Dept: INTERNAL MEDICINE | Facility: CLINIC | Age: 34
End: 2024-11-12
Payer: COMMERCIAL

## 2024-11-12 DIAGNOSIS — F19.10 SUBSTANCE ABUSE: ICD-10-CM

## 2024-11-12 DIAGNOSIS — Z02.83 ENCOUNTER FOR DRUG SCREENING: Primary | ICD-10-CM

## 2024-11-12 DIAGNOSIS — F19.11 HISTORY OF DRUG ABUSE: ICD-10-CM

## 2024-11-12 LAB
HCV GENTYP SERPL NAA+PROBE: 3
HCV GENTYP SERPL NAA+PROBE: NORMAL
HCV RNA SERPL NAA+PROBE-ACNC: NORMAL IU/ML
HCV RNA SERPL NAA+PROBE-LOG IU: 5.61 LOG10 IU/ML
LABORATORY COMMENT REPORT: NORMAL

## 2024-11-13 ENCOUNTER — TELEPHONE (OUTPATIENT)
Dept: GASTROENTEROLOGY | Facility: CLINIC | Age: 34
End: 2024-11-13
Payer: COMMERCIAL

## 2024-11-13 DIAGNOSIS — B18.2 CHRONIC HEPATITIS C WITHOUT HEPATIC COMA: Primary | ICD-10-CM

## 2024-11-13 LAB
AMPHETAMINES UR QL SCN: POSITIVE NG/ML
BARBITURATES UR QL SCN: NEGATIVE NG/ML
BENZODIAZ UR QL SCN: POSITIVE NG/ML
BZE UR QL SCN: NEGATIVE NG/ML
CANNABINOIDS UR QL SCN: NEGATIVE NG/ML
CREAT UR-MCNC: 25.6 MG/DL (ref 20–300)
LABORATORY COMMENT REPORT: ABNORMAL
METHADONE UR QL SCN: NEGATIVE NG/ML
OPIATES UR QL SCN: NEGATIVE NG/ML
OXYCODONE+OXYMORPHONE UR QL SCN: NEGATIVE NG/ML
PCP UR QL: NEGATIVE NG/ML
PH UR: 5.6 [PH] (ref 4.5–8.9)
PROPOXYPH UR QL SCN: NEGATIVE NG/ML

## 2024-11-13 NOTE — TELEPHONE ENCOUNTER
She has chronic Hepatitis C infection, genotype 3, treatment experienced (took Epclusa (sofosbuvir/velpatasvir) x only 1 month), without cirrhosis (F1). Will discuss treatment with Dr. Hudson first.    She needs both Hep A and B vaccination series.

## 2024-11-14 RX ORDER — SOFOSBUVIR, VELPATASVIR, AND VOXILAPREVIR 400; 100; 100 MG/1; MG/1; MG/1
1 TABLET, FILM COATED ORAL DAILY
Qty: 28 TABLET | Refills: 2
Start: 2024-11-14 | End: 2024-11-15 | Stop reason: SDUPTHER

## 2024-11-14 NOTE — TELEPHONE ENCOUNTER
She has chronic Hepatitis C infection, genotype 3, partial treatment experienced (took Epclusa (sofosbuvir/velpatasvir) x only 1 month), without cirrhosis (F1). Discussed with Dr. Hudson. Will give Voesvi 1 tab PO once daily x 12 weeks for treatment.      She needs both Hep A and B vaccination series.

## 2024-11-15 ENCOUNTER — OFFICE VISIT (OUTPATIENT)
Dept: INTERNAL MEDICINE | Facility: CLINIC | Age: 34
End: 2024-11-15
Payer: COMMERCIAL

## 2024-11-15 ENCOUNTER — SPECIALTY PHARMACY (OUTPATIENT)
Dept: PHARMACY | Facility: HOSPITAL | Age: 34
End: 2024-11-15
Payer: COMMERCIAL

## 2024-11-15 ENCOUNTER — PRIOR AUTHORIZATION (OUTPATIENT)
Dept: INTERNAL MEDICINE | Facility: CLINIC | Age: 34
End: 2024-11-15

## 2024-11-15 VITALS
HEIGHT: 66 IN | BODY MASS INDEX: 36.16 KG/M2 | OXYGEN SATURATION: 97 % | SYSTOLIC BLOOD PRESSURE: 120 MMHG | DIASTOLIC BLOOD PRESSURE: 72 MMHG | WEIGHT: 225 LBS | HEART RATE: 76 BPM | TEMPERATURE: 97.7 F

## 2024-11-15 DIAGNOSIS — E66.812 CLASS 2 SEVERE OBESITY DUE TO EXCESS CALORIES WITH SERIOUS COMORBIDITY AND BODY MASS INDEX (BMI) OF 36.0 TO 36.9 IN ADULT: Primary | ICD-10-CM

## 2024-11-15 DIAGNOSIS — B18.2 CHRONIC HEPATITIS C WITHOUT HEPATIC COMA: ICD-10-CM

## 2024-11-15 DIAGNOSIS — E66.01 CLASS 2 SEVERE OBESITY DUE TO EXCESS CALORIES WITH SERIOUS COMORBIDITY AND BODY MASS INDEX (BMI) OF 36.0 TO 36.9 IN ADULT: Primary | ICD-10-CM

## 2024-11-15 PROCEDURE — 99214 OFFICE O/P EST MOD 30 MIN: CPT | Performed by: NURSE PRACTITIONER

## 2024-11-15 RX ORDER — PLECANATIDE 3 MG/1
TABLET ORAL
COMMUNITY
Start: 2024-11-08

## 2024-11-15 RX ORDER — SEMAGLUTIDE 0.25 MG/.5ML
0.25 INJECTION, SOLUTION SUBCUTANEOUS WEEKLY
Qty: 2 ML | Refills: 5 | Status: SHIPPED | OUTPATIENT
Start: 2024-11-15

## 2024-11-15 RX ORDER — SOFOSBUVIR, VELPATASVIR, AND VOXILAPREVIR 400; 100; 100 MG/1; MG/1; MG/1
1 TABLET, FILM COATED ORAL DAILY
Qty: 28 TABLET | Refills: 2 | Status: SHIPPED | OUTPATIENT
Start: 2024-11-15

## 2024-11-15 NOTE — PROGRESS NOTES
[facility]  Hepatitis C Initial Assessment     Selma Florez is a 34 y.o. female diagnosed with Hepatitis C and enrolled in the [facility] for treatment. An initial outreach was conducted, including assessment of therapy appropriateness and specialty medication education for Vosevi.    Previous Hep C Treatment: Patient is not treatment naïve. Previously on Epclusa for ~1 month but did not finish treatment.     Relevant Past Medical History and Comorbidities  Past Medical History:   Diagnosis Date    ADHD     Asthma     Coma     2020 or 2021    Hepatitis C     Irritable bowel syndrome     Obesity     OCD (obsessive compulsive disorder)     Panic disorder      Social History     Socioeconomic History    Marital status:    Tobacco Use    Smoking status: Some Days     Current packs/day: 0.25     Average packs/day: 0.3 packs/day for 15.0 years (3.8 ttl pk-yrs)     Types: Cigarettes    Smokeless tobacco: Never    Tobacco comments:     Off and on since i was 13.   Vaping Use    Vaping status: Some Days    Substances: Nicotine, Flavoring    Devices: Disposable   Substance and Sexual Activity    Alcohol use: Not Currently    Drug use: Not Currently     Types: Cocaine(coke), Marijuana, Methamphetamines     Comment: [place]at - occassional use    Sexual activity: Yes     Partners: Male     Birth control/protection: Hysterectomy       Allergies  Patient has no known allergies.    Insurance Coverage & Financial Support: [employer]     Current Medication List:    Current Outpatient Medications:     albuterol sulfate  (90 Base) MCG/ACT inhaler, Inhale 2 puffs Every 6 (Six) Hours As Needed for Wheezing or Shortness of Air., Disp: 18 g, Rfl: 5    ALPRAZolam (Xanax) 1 MG tablet, Take 1 tablet by mouth At Night As Needed. 3-4 times week (Patient taking differently: Take 1 tablet by mouth 3 times a day.), Disp: , Rfl:     amphetamine-dextroamphetamine (ADDERALL) 20 MG tablet, Take  1 tablet by mouth Daily., Disp: , Rfl:     Chlorhexidine Gluconate 4 % solution, Apply 1 Application topically Daily As Needed (folliculitis)., Disp: 3785 mL, Rfl: 5    Fluticasone-Salmeterol (Advair Diskus) 100-50 MCG/ACT DISKUS, Inhale 1 puff 2 (Two) Times a Day., Disp: 60 each, Rfl: 5    lisdexamfetamine (Vyvanse) 60 MG capsule, Take 1 capsule by mouth Every Morning, Disp: , Rfl:     Multiple Vitamins-Minerals (WOMENS MULTI PO), Take 1 tablet by mouth Daily., Disp: , Rfl:     Semaglutide-Weight Management (Wegovy) 0.25 MG/0.5ML solution auto-injector, Inject 0.5 mL under the skin into the appropriate area as directed 1 (One) Time Per Week., Disp: 2 mL, Rfl: 5    Wmrvkrik-Roblimoxk-Foslmnadiy (Vosevi) 400-100-100 MG tablet, Take 1 tablet by mouth Daily., Disp: 28 tablet, Rfl: 2    Trulance 3 MG tablet, , Disp: , Rfl:     Vivitrol 380 MG reconstituted suspension IM suspension, Inject 380 mg into the appropriate muscle as directed by prescriber Every 30 (Thirty) Days., Disp: , Rfl:     Vraylar 4.5 MG capsule, Take 1 capsule by mouth Daily. (Patient taking differently: Take 1.5 mg by mouth Daily.), Disp: , Rfl:     Drug Interactions:  Medication list was reviewed for drug-drug interactions with Hepatitis C treatment. None noted.    Relevant Laboratory Values:  Lab Results   Component Value Date    GLUCOSE 83 11/08/2024    CALCIUM 10.6 (H) 11/08/2024     11/08/2024    K 4.7 11/08/2024    CO2 25.5 11/08/2024     11/08/2024    BUN 12 11/08/2024    CREATININE 0.81 11/08/2024    EGFRIFAFRI >60 06/10/2022    EGFRIFNONA >60 06/10/2022    BCR 14.8 11/08/2024    ANIONGAP 11.5 11/08/2024     (H) 11/08/2024     (H) 11/08/2024     Lab Results   Component Value Date    WBC 6.79 10/22/2024    HGB 13.9 10/22/2024    HCT 40.9 10/22/2024    MCV 96.0 10/22/2024     10/22/2024       HCV RNA quant: 3  Hepatitis C Genotype   Date Value Ref Range Status   11/08/2024 3  Final   06/10/2022 Detected (A) Not  Detected. Final     HCV Genotype   Date Value Ref Range Status   11/08/2024 Comment  Final     Comment:     To be performed on this specimen.       Fibrosis: F1    FIB-4: 1.01    Immunizations:  Hepatitis A: needs vaccine  Hepatitis B: needs vaccine  Lab Results   Component Value Date    HAV Negative 11/08/2024    HEPBCAB Negative 11/08/2024         Co-infection Evaluation:  HIV -- Negative  Hepatitis B -- negative      Initial Education Provided for Vosevi  The patient has been provided with the following education for Vosevi (Sofosbuvir, Velpatasvir, and Voxilaprevir). All questions and concerns have been addressed prior to the patient receiving the medication, and the patient has verbalized understanding of the education and any materials provided. Additional patient education shall be provided and documented upon request by the patient, provider or payer.       The following counseling points for Vosevi (Sofosbuvir, Velpatasvir, and Voxilaprevir) were addressed:  Goal of treatment:  This medication is used to treat hepatitis C infection with the goal of curing infection.  Directions for use:  Take 1 tablet by mouth once daily for a total of 12 weeks. Take tablets at the same time each day, with food.  Missed doses:  It is very important that you do not miss a dose of this medication. Use a pill planner, medication adherence taz or other tool to help you remember to take your medication.  Do not stop taking this medication without talking to your provider.  If you need to take an antacid medication that contains aluminum and/or magnesium, take it either 4 hours before or 4 hours after you take your Vosevi dose.  Adverse effects:  Frequently reported side effects of this medication include: headache, loss of energy, nausea and diarrhea.   Go to the ED or call 911 with signs of a significant reaction including wheezing; chest tightness; fever; itching; bad cough; blue skin color; seizures; or swelling of face,  lips, tongue or throat.   Hepatic decompensation and hepatic failure have been reported. This typically occurs within the first 4 weeks of treatment initiation. Talk to your doctor right away if you notice dark urine, fatigue, lack of appetite, nausea, abdominal pain, light-colored stools, vomiting or yellow skin.  Follow-Up:  Be sure to keep your follow up appointment with our clinic 4 weeks after starting this medication to ensure you are tolerating it well and that you are responding appropriately to therapy.   Make sure to tell your doctor and pharmacist about all medications you are taking, including herbal supplements and OTC products at each visit. Contact clinic if any new medications are started during treatment.  Storage:  Store this medication at room temperature, away from any extreme temperatures or moisture exposure.     Patient Specific Counseling Points:   If diabetic: Patients with diabetes should closely monitor their blood sugar after starting this medication as it may lead to an improvement in glucose metabolism, potentially resulting in hypoglycemia. Monitor for signs and symptoms of hypoglycemia and follow the rule of 15 to treat hypoglycemia.  If childbearing age: Use of contraception during treatment in females of childbearing potential is recommended. Consider postponing pregnancy until therapy is complete to reduce the risk of HCV transmission. This medication should not be used in pregnant females and it is not known if the medication is present in breast milk.        Goals of Therapy:  Patient Goals of Therapy: Adherence  Clinical Goals or Therapeutic Targets, If Applicable: SVR 12 weeks    Attestation:  I attest that the initiated specialty medication is appropriate for the patient based on my assessment.  If the prescribed therapy is at any point deemed not appropriate based on the current or future assessments, a consultation will be initiated with the patient's specialty care provider  to determine the best course of action. The revised plan of therapy will be documented along with any additional patient education provided.     Assessment & Plan:  Patient has Hepatitis C, genotype 3 (F1)(calculated FIB-4 = 1.01) and is not treatment naïve. Patient has been prescribed Vosevi 1 tablet daily x 12 weeks. Medication education and counseling provided as above.  Patient will obtain medication from [facility] retail pharmacy.  The following immunizations are needed: Hepatitis A and Hepatitis B.   Patient will follow up in clinic 4 weeks after starting medication to assess virologic response and medication tolerability.     Anisa Gonzales RPH  11/15/2024  14:35 EST

## 2024-11-15 NOTE — PROGRESS NOTES
Office Visit      Date: 11/15/2024   Patient Name: Selma Florez  : 1990   MRN: [id number]     Chief Complaint:    Chief Complaint   Patient presents with    Weight Check     Discuss weight loss medications       History of Present Illness: Selma Florez is a 34 y.o. female presenting to discuss weight loss options. Body mass index is 36.32 kg/m². Pregnancy first caused weight gain. Now psych medications are contributing. Appetite is fair, some days she eats less calories and other days she over eat calories. Physically active but no current purposeful exercise at this current time.     Answers submitted by the patient for this visit:  Other (Submitted on 2024)  Please describe your symptoms.: Wanting to discuss weight loss options, possible diet pills, other options. I eat clean. Im active, I stay away from sugar, fast foods, i eat low carb. I have taken the diet pill route before and when i reached my goal I quit taking them and knew i had to work to maintain keeping it off.  Have you had these symptoms before?: Yes  How long have you been having these symptoms?: Greater than 2 weeks  Please list any medications you are currently taking for this condition.: N/A  Please describe any probable cause for these symptoms. : Medications that cause weight gain  Primary Reason for Visit (Submitted on 2024)  What is the primary reason for your visit?: Problem Not Listed    Subjective      Review of Systems:   Pertinent ROS noted in HPI.     I have reviewed the patients family history, social history, past medical history, past surgical history and have updated it as appropriate.     Medications:     Current Outpatient Medications:     Trulance 3 MG tablet, , Disp: , Rfl:     albuterol sulfate  (90 Base) MCG/ACT inhaler, Inhale 2 puffs Every 6 (Six) Hours As Needed for Wheezing or Shortness of Air., Disp: 18 g, Rfl: 5    ALPRAZolam (Xanax) 1 MG tablet, Take 1 tablet by mouth  At Night As Needed. 3-4 times week (Patient taking differently: Take 1 tablet by mouth 3 times a day.), Disp: , Rfl:     amphetamine-dextroamphetamine (ADDERALL) 20 MG tablet, Take 1 tablet by mouth Daily., Disp: , Rfl:     Chlorhexidine Gluconate 4 % solution, Apply 1 Application topically Daily As Needed (folliculitis)., Disp: 3785 mL, Rfl: 5    Fluticasone-Salmeterol (Advair Diskus) 100-50 MCG/ACT DISKUS, Inhale 1 puff 2 (Two) Times a Day., Disp: 60 each, Rfl: 5    lisdexamfetamine (Vyvanse) 60 MG capsule, Take 1 capsule by mouth Every Morning, Disp: , Rfl:     Multiple Vitamins-Minerals (WOMENS MULTI PO), Take 1 tablet by mouth Daily., Disp: , Rfl:     Semaglutide-Weight Management (Wegovy) 0.25 MG/0.5ML solution auto-injector, Inject 0.5 mL under the skin into the appropriate area as directed 1 (One) Time Per Week., Disp: 2 mL, Rfl: 5    Dewmswlx-Ltwseiyxj-Fkvbkozwpg (Vosevi) 400-100-100 MG tablet, Take 1 tablet by mouth Daily., Disp: 28 tablet, Rfl: 2    Vivitrol 380 MG reconstituted suspension IM suspension, Inject 380 mg into the appropriate muscle as directed by prescriber Every 30 (Thirty) Days., Disp: , Rfl:     Vraylar 4.5 MG capsule, Take 1 capsule by mouth Daily. (Patient taking differently: Take 1.5 mg by mouth Daily.), Disp: , Rfl:     Allergies:   No Known Allergies    Objective     Physical Exam  Vitals and nursing note reviewed.   Constitutional:       General: She is not in acute distress.     Appearance: Normal appearance. She is obese.   HENT:      Right Ear: External ear normal.      Left Ear: External ear normal.   Eyes:      Extraocular Movements: Extraocular movements intact.      Pupils: Pupils are equal, round, and reactive to light.   Cardiovascular:      Rate and Rhythm: Normal rate.   Pulmonary:      Effort: Pulmonary effort is normal.   Musculoskeletal:         General: Normal range of motion.      Cervical back: Normal range of motion.      Right lower leg: No edema.      Left lower  "leg: No edema.   Skin:     General: Skin is warm and dry.   Neurological:      Mental Status: She is alert and oriented to person, place, and time.   Psychiatric:         Mood and Affect: Mood normal.         Vital Signs:   Vitals:    11/15/24 1259   BP: 120/72   Pulse: 76   Temp: 97.7 °F (36.5 °C)   SpO2: 97%   Weight: 102 kg (225 lb)   Height: 167.6 cm (66\")     Body mass index is 36.32 kg/m².  Class 2 Severe Obesity (BMI >=35 and <=39.9). Obesity-related health conditions include the following: hypertension, diabetes mellitus, and dyslipidemias. Obesity is worsening. BMI is is above average; BMI management plan is completed. We discussed portion control, increasing exercise, and pharmacologic options including oral and injectable meds .     Assessment / Plan      Assessment/Plan:   Problem List Items Addressed This Visit    None  Visit Diagnoses       Class 2 severe obesity due to excess calories with serious comorbidity and body mass index (BMI) of 36.0 to 36.9 in adult    -  Primary    Relevant Medications    Semaglutide-Weight Management (Wegovy) 0.25 MG/0.5ML solution auto-injector            Long discussion regarding weight loss options oral and injectable.  She would like to try a low-dose GLP-1.  We have discussed risk of worsening constipation with this.  She does not have any contraindications to start a GLP-1.  She is aware box box warning.  If she has any GI upset she will discontinue the medication.  Discussed it is normal to have some intermittent nausea but if any worsening constipation, nausea vomiting will stop the medication.  Other option she can consider include Topamax, metformin.  She needs to eat in a calorie deficit, monitor her calories on an taz such as [employer] fitness pal or lose it, can download fit on taz which has free exercises.  She needs to try to get cardiovascular exercise at least 30 minutes daily.  Weigh self once a week.  Once start injection needs to be seen once a month for " monitoring of side effects and weight.  If insurance does not cover we will discuss other options.    Follow Up:   1 month after starting Spencer GOLDSTEIN  [facility] [place]

## 2024-11-19 DIAGNOSIS — E66.01 CLASS 2 SEVERE OBESITY DUE TO EXCESS CALORIES WITH SERIOUS COMORBIDITY AND BODY MASS INDEX (BMI) OF 36.0 TO 36.9 IN ADULT: Primary | ICD-10-CM

## 2024-11-19 DIAGNOSIS — E66.812 CLASS 2 SEVERE OBESITY DUE TO EXCESS CALORIES WITH SERIOUS COMORBIDITY AND BODY MASS INDEX (BMI) OF 36.0 TO 36.9 IN ADULT: Primary | ICD-10-CM

## 2024-11-19 DIAGNOSIS — R30.0 DYSURIA: Primary | ICD-10-CM

## 2024-11-19 PROCEDURE — 81003 URINALYSIS AUTO W/O SCOPE: CPT | Performed by: NURSE PRACTITIONER

## 2024-11-19 RX ORDER — TOPIRAMATE 50 MG/1
TABLET, FILM COATED ORAL
Qty: 180 TABLET | Refills: 1 | Status: SHIPPED | OUTPATIENT
Start: 2024-11-19

## 2024-11-20 ENCOUNTER — CLINICAL SUPPORT (OUTPATIENT)
Dept: INTERNAL MEDICINE | Facility: CLINIC | Age: 34
End: 2024-11-20
Payer: COMMERCIAL

## 2024-11-20 DIAGNOSIS — Z02.83 ENCOUNTER FOR DRUG SCREENING: ICD-10-CM

## 2024-11-20 DIAGNOSIS — F19.10 SUBSTANCE ABUSE: ICD-10-CM

## 2024-11-20 DIAGNOSIS — F19.11 HISTORY OF DRUG ABUSE: ICD-10-CM

## 2024-11-20 LAB
BILIRUB BLD-MCNC: NEGATIVE MG/DL
CLARITY, POC: CLEAR
COLOR UR: ABNORMAL
EXPIRATION DATE: ABNORMAL
GLUCOSE UR STRIP-MCNC: NEGATIVE MG/DL
KETONES UR QL: NEGATIVE
LEUKOCYTE EST, POC: NEGATIVE
Lab: ABNORMAL
NITRITE UR-MCNC: NEGATIVE MG/ML
PH UR: 6 [PH] (ref 5–8)
PROT UR STRIP-MCNC: NEGATIVE MG/DL
RBC # UR STRIP: NEGATIVE /UL
SP GR UR: 1 (ref 1–1.03)
UROBILINOGEN UR QL: ABNORMAL

## 2024-11-21 LAB
AMPHETAMINES UR QL SCN: POSITIVE NG/ML
BARBITURATES UR QL SCN: NEGATIVE NG/ML
BENZODIAZ UR QL SCN: POSITIVE NG/ML
BZE UR QL SCN: NEGATIVE NG/ML
CANNABINOIDS UR QL SCN: NEGATIVE NG/ML
CREAT UR-MCNC: 27.4 MG/DL (ref 20–300)
LABORATORY COMMENT REPORT: ABNORMAL
METHADONE UR QL SCN: NEGATIVE NG/ML
OPIATES UR QL SCN: NEGATIVE NG/ML
OXYCODONE+OXYMORPHONE UR QL SCN: NEGATIVE NG/ML
PCP UR QL: NEGATIVE NG/ML
PH UR: 5.6 [PH] (ref 4.5–8.9)
PROPOXYPH UR QL SCN: NEGATIVE NG/ML

## 2024-11-22 ENCOUNTER — TELEPHONE (OUTPATIENT)
Dept: INTERNAL MEDICINE | Facility: CLINIC | Age: 34
End: 2024-11-22
Payer: COMMERCIAL

## 2024-11-22 DIAGNOSIS — R82.998 DARK URINE: ICD-10-CM

## 2024-11-22 DIAGNOSIS — R82.998 DARK URINE: Primary | ICD-10-CM

## 2024-11-22 DIAGNOSIS — M79.10 MUSCLE PAIN: Primary | ICD-10-CM

## 2024-11-22 LAB
BACTERIA UR CULT: NORMAL
BACTERIA UR CULT: NORMAL

## 2024-11-22 NOTE — TELEPHONE ENCOUNTER
Discussed messages with patient. She said she drinks a lot of sparkling water daily which is carbonated. Drinking cranberry juice also. Forgets to eat, eats little. Takes 2 different Adderalls. Stated that she experiences nausea. Eats well when she does eat. Has 1-2 Liquid IV's daily. Will get labs done today at [facility]. Has an errand to run 1st.

## 2024-11-23 LAB — CK SERPL-CCNC: 126 U/L (ref 32–182)

## 2024-12-02 ENCOUNTER — CLINICAL SUPPORT (OUTPATIENT)
Dept: INTERNAL MEDICINE | Facility: CLINIC | Age: 34
End: 2024-12-02
Payer: COMMERCIAL

## 2024-12-03 LAB
AMPHETAMINES UR QL SCN: POSITIVE NG/ML
BARBITURATES UR QL SCN: NEGATIVE NG/ML
BENZODIAZ UR QL SCN: POSITIVE NG/ML
BZE UR QL SCN: NEGATIVE NG/ML
CANNABINOIDS UR QL SCN: NEGATIVE NG/ML
CREAT UR-MCNC: 90.2 MG/DL (ref 20–300)
LABORATORY COMMENT REPORT: ABNORMAL
METHADONE UR QL SCN: NEGATIVE NG/ML
OPIATES UR QL SCN: NEGATIVE NG/ML
OXYCODONE+OXYMORPHONE UR QL SCN: NEGATIVE NG/ML
PCP UR QL: NEGATIVE NG/ML
PH UR: 5.5 [PH] (ref 4.5–8.9)
PROPOXYPH UR QL SCN: NEGATIVE NG/ML

## 2024-12-09 RX ORDER — PLECANATIDE 3 MG/1
1 TABLET ORAL DAILY
Qty: 90 TABLET | Refills: 3 | Status: SHIPPED | OUTPATIENT
Start: 2024-12-09

## 2024-12-09 NOTE — TELEPHONE ENCOUNTER
Rx Refill Note  Requested Prescriptions     Pending Prescriptions Disp Refills    Trulance 3 MG tablet [Pharmacy Med Name: TRULANCE 3 MG TABLET] 90 tablet 1     Sig: TAKE 1 TABLET BY MOUTH DAILY      Last office visit with prescribing clinician: 11/15/2024   Last telemedicine visit with prescribing clinician: Visit date not found   Next office visit with prescribing clinician: Visit date not found                         Zoraida Eason MA  12/09/24, 08:38 EST

## 2024-12-11 ENCOUNTER — SPECIALTY PHARMACY (OUTPATIENT)
Dept: PHARMACY | Facility: HOSPITAL | Age: 34
End: 2024-12-11
Payer: COMMERCIAL

## 2024-12-11 ENCOUNTER — CLINICAL SUPPORT (OUTPATIENT)
Dept: INTERNAL MEDICINE | Facility: CLINIC | Age: 34
End: 2024-12-11
Payer: COMMERCIAL

## 2024-12-11 DIAGNOSIS — F19.10 SUBSTANCE ABUSE: ICD-10-CM

## 2024-12-11 DIAGNOSIS — Z02.83 ENCOUNTER FOR DRUG SCREENING: ICD-10-CM

## 2024-12-11 DIAGNOSIS — F19.11 HISTORY OF DRUG ABUSE: ICD-10-CM

## 2024-12-13 ENCOUNTER — DISEASE STATE MANAGEMENT VISIT (OUTPATIENT)
Dept: PHARMACY | Facility: HOSPITAL | Age: 34
End: 2024-12-13
Payer: COMMERCIAL

## 2024-12-13 ENCOUNTER — SPECIALTY PHARMACY (OUTPATIENT)
Dept: PHARMACY | Facility: HOSPITAL | Age: 34
End: 2024-12-13
Payer: COMMERCIAL

## 2024-12-13 ENCOUNTER — LAB (OUTPATIENT)
Dept: LAB | Facility: HOSPITAL | Age: 34
End: 2024-12-13
Payer: COMMERCIAL

## 2024-12-13 VITALS
BODY MASS INDEX: 38.16 KG/M2 | SYSTOLIC BLOOD PRESSURE: 130 MMHG | TEMPERATURE: 98.7 F | OXYGEN SATURATION: 93 % | HEART RATE: 95 BPM | DIASTOLIC BLOOD PRESSURE: 96 MMHG | WEIGHT: 236.4 LBS

## 2024-12-13 DIAGNOSIS — R82.998 DARK URINE: ICD-10-CM

## 2024-12-13 DIAGNOSIS — R74.8 ELEVATED LIVER ENZYMES: ICD-10-CM

## 2024-12-13 DIAGNOSIS — B18.2 CHRONIC HEPATITIS C WITHOUT HEPATIC COMA: Primary | ICD-10-CM

## 2024-12-13 DIAGNOSIS — B18.2 CHRONIC HEPATITIS C WITHOUT HEPATIC COMA: ICD-10-CM

## 2024-12-13 PROBLEM — F17.200 SMOKING ADDICTION: Status: ACTIVE | Noted: 2024-12-13

## 2024-12-13 LAB
25(OH)D3 SERPL-MCNC: 40.4 NG/ML (ref 30–100)
ALBUMIN SERPL-MCNC: 3.9 G/DL (ref 3.5–5.2)
ALBUMIN/GLOB SERPL: 1 G/DL
ALP SERPL-CCNC: 76 U/L (ref 39–117)
ALT SERPL W P-5'-P-CCNC: 28 U/L (ref 1–33)
AMPHETAMINES UR QL SCN: POSITIVE NG/ML
ANION GAP SERPL CALCULATED.3IONS-SCNC: 11.4 MMOL/L (ref 5–15)
AST SERPL-CCNC: 22 U/L (ref 1–32)
BARBITURATES UR QL SCN: NEGATIVE NG/ML
BENZODIAZ UR QL SCN: POSITIVE NG/ML
BILIRUB SERPL-MCNC: 0.5 MG/DL (ref 0–1.2)
BUN SERPL-MCNC: 15 MG/DL (ref 6–20)
BUN/CREAT SERPL: 17.2 (ref 7–25)
BZE UR QL SCN: NEGATIVE NG/ML
CALCIUM SPEC-SCNC: 9.3 MG/DL (ref 8.6–10.5)
CANNABINOIDS UR QL SCN: NEGATIVE NG/ML
CHLORIDE SERPL-SCNC: 104 MMOL/L (ref 98–107)
CO2 SERPL-SCNC: 23.6 MMOL/L (ref 22–29)
CREAT SERPL-MCNC: 0.87 MG/DL (ref 0.57–1)
CREAT UR-MCNC: 12 MG/DL (ref 20–300)
EGFRCR SERPLBLD CKD-EPI 2021: 89.8 ML/MIN/1.73
FOLATE SERPL-MCNC: 13.7 NG/ML (ref 4.78–24.2)
GLOBULIN UR ELPH-MCNC: 3.8 GM/DL
GLUCOSE SERPL-MCNC: 89 MG/DL (ref 65–99)
LABORATORY COMMENT REPORT: ABNORMAL
METHADONE UR QL SCN: NEGATIVE NG/ML
OPIATES UR QL SCN: NEGATIVE NG/ML
OXYCODONE+OXYMORPHONE UR QL SCN: NEGATIVE NG/ML
PCP UR QL: NEGATIVE NG/ML
PH UR: 5.7 [PH] (ref 4.5–8.9)
POTASSIUM SERPL-SCNC: 4.6 MMOL/L (ref 3.5–5.2)
PROPOXYPH UR QL SCN: NEGATIVE NG/ML
PROT SERPL-MCNC: 7.7 G/DL (ref 6–8.5)
SODIUM SERPL-SCNC: 139 MMOL/L (ref 136–145)
SP GR UR: 1
VIT B12 BLD-MCNC: 380 PG/ML (ref 211–946)

## 2024-12-13 PROCEDURE — 36415 COLL VENOUS BLD VENIPUNCTURE: CPT

## 2024-12-13 PROCEDURE — 82746 ASSAY OF FOLIC ACID SERUM: CPT | Performed by: NURSE PRACTITIONER

## 2024-12-13 PROCEDURE — 82607 VITAMIN B-12: CPT | Performed by: NURSE PRACTITIONER

## 2024-12-13 PROCEDURE — 80053 COMPREHEN METABOLIC PANEL: CPT

## 2024-12-13 PROCEDURE — G0463 HOSPITAL OUTPT CLINIC VISIT: HCPCS

## 2024-12-13 PROCEDURE — 87522 HEPATITIS C REVRS TRNSCRPJ: CPT

## 2024-12-13 PROCEDURE — 82306 VITAMIN D 25 HYDROXY: CPT | Performed by: NURSE PRACTITIONER

## 2024-12-13 RX ORDER — ALPRAZOLAM 2 MG/1
1 TABLET ORAL 3 TIMES DAILY PRN
COMMUNITY
Start: 2024-11-15

## 2024-12-13 NOTE — PROGRESS NOTES
[facility] Clinic  Smoking Cessation Services       Patient self-referred herself to the [facility] to receive smoking cessation services. Initial consultation conducted today with PharmD.    Past Medical History:   Diagnosis Date    ADHD     Asthma     Coma     2020 or 2021    Hepatitis C     Irritable bowel syndrome     Obesity     OCD (obsessive compulsive disorder)     Panic disorder      No Known Allergies  Current Outpatient Medications   Medication Sig Dispense Refill    albuterol sulfate  (90 Base) MCG/ACT inhaler Inhale 2 puffs Every 6 (Six) Hours As Needed for Wheezing or Shortness of Air. 18 g 5    ALPRAZolam (XANAX) 2 MG tablet Take 0.5 tablets by mouth 3 (Three) Times a Day As Needed for Anxiety.      amphetamine-dextroamphetamine (ADDERALL) 20 MG tablet Take 1 tablet by mouth Daily.      Cariprazine HCl (Vraylar) 1.5 MG capsule capsule Take 1 capsule by mouth Daily.      Chlorhexidine Gluconate 4 % solution Apply 1 Application topically Daily As Needed (folliculitis). 3785 mL 5    Fluticasone-Salmeterol (Advair Diskus) 100-50 MCG/ACT DISKUS Inhale 1 puff 2 (Two) Times a Day. 60 each 5    lisdexamfetamine (Vyvanse) 60 MG capsule Take 1 capsule by mouth Every Morning      Multiple Vitamins-Minerals (WOMENS MULTI PO) Take 1 tablet by mouth Daily.      nicotine polacrilex (NICORETTE) 4 MG gum Chew 1 piece every 1-2 hours as needed for nicotine cravings 50 each 0    Plecanatide (Trulance) 3 MG tablet TAKE 1 TABLET BY MOUTH DAILY 90 tablet 3    Zptskbff-Qklmbfjvq-Inlipovmlx (Vosevi) 400-100-100 MG tablet Take 1 tablet by mouth Daily. 28 tablet 2    Vivitrol 380 MG reconstituted suspension IM suspension Inject 380 mg into the appropriate muscle as directed by prescriber Every 30 (Thirty) Days.       No current facility-administered medications for this visit.         Assessment:    Tobacco History Evaluation:  Years of tobacco use: 15  Average number of  cigarettes or other tobacco/nicotine products per day: ~1-2 cigarettes per day but uses vape pen often  Number of Previous Quit Attempts: 1-2 times   Previous tobacco cessation medication attempts, failures, intolerances: Nicotine patches--causes welt on her arm  Other recreational substance use: None   1st cigarette of the day smoked: As soon as she wakes up     Trigger Evaluation: After meals     Evaluation of Contraindications: None    Fagerstrom Test Score:  How soon after you wake do you smoke your first cigarette? Within 5 minutes (3 points)  5-30 minutes (2 points)  31-60 minutes (1 point)   2. Do you find it difficult to refrain from smoking in places where its forbidden, such as the library, Faith, or theater? Yes (1 point)  No (0 points)   3. Which cigarette would you hate to give up most? The first one in the morning (1 point)  Any other (0 points)   4. How many cigarettes a day do you smoke? 10 or less (0 points)  11-20 (1 point)  21-30 (2 points)  31 or more (3 points)   5. Do you smoke more frequently during the first hours? Yes (1 point)  No (0 points)   6. Do you smoke when you are so ill that you can't get out of bed? Yes (1 point)  No (0 points)   Scorin  1-2 = Low Dependence: [May not need NRT] Patch: 7mg daily; Lozenge 2mg; Gum 2mg   3-4 = Low to Moderate Dependence: Patch 14mg daily; Inhaler: 6-12 cartridges daily; Lozenge 2mg daily; Gum 2mg daily   5-7 = Moderate Dependence: Patch 21mg daily; Inhaler: 6-12 cartridges daily; Lozenge 4mg; Gum 4mg   8+ = High Dependence: Patch 21mg daily; Inhaler 6-12 cartidges daily; Lozenge 4mg daily; Gum 4mg daily     Readiness to quit:   Stage 1: Not ready to quit in the next month  Stage 2: Ready to quit in the next month  Stage 3: Recent quitter, quit within the past 6 months   Stage 4: Former tobacco user, quit > 6 months ago    Plan:     1. Quit Date Planned: 24  2. Behavioral options for quitting reviewed such as using a cut off straw to  substitute for a cigarette to help satisfy hand oral fixation, she will work on crafts when she becomes bored, and will chew gum or candy when she begins craving a cigarette.   3. Provided additional resources to help with tobacco cessation. Provided patient with benefits of quitting handout.    4. Medication options reviewed.  Risks, benefits, and side effects discussed and questions answered.   5. Education was provided regarding: motivation to cease tobacco use, drug information on the medication dispensed (including directions for use and adverse effects), nicotine withdrawal symptoms, lifestyle modifications and techniques to prevent relapse.   6. Pursuant to the [place] Board of Pharmacy Approved Protocol,  will order: Nicotine 4 mg gum with directions to chew 1 piece every 1-2 hours as needed for nicotine cravings with a max of 20 pieces per day.   7. Will notify the patient's PCP, Willa Talavera.  8. Will follow up with patient one week after quit date,       Anisa Gonzales RPH  24  12:34 EST    Time spent providing smoking cessation counselin minutes

## 2024-12-13 NOTE — PROGRESS NOTES
Follow Up Note     Date: 2024   Patient Name: Selma Florez  MRN: [id number]  : 1990     Primary Care Provider: Porsche Talavera APRN     Chief Complaint   Patient presents with    Hepatitis C     Pt here for 4 week follow up on Vosevi     History of present illness:   2024  Selma Florez is a 34 y.o. female who is here today for follow up regarding Hepatitis C.    Has been taking Vosevi 1 tab PO once daily for the past approx 4 weeks for Hep C therapy. No missed doses. Has noticed some increased fatigue but that seems to be improving now. Also trying to stop smoking.    Interval History:  2024  She started Hep C treatment with Epclusa from Aug 2024 and took the medication for 5 weeks. She was not able to complete treatment when she left the rehab. She had severe migraines and vomiting during the treatment. Has been clean now since 2024. She is on vivotrol now.      Feeling well today without GI complaints. Takes Linzess 72 mcg once daily for constipation with good improvements.     Subjective     Past Medical History:   Diagnosis Date    ADHD     Asthma     Coma      or     Hepatitis C     Irritable bowel syndrome     Obesity     OCD (obsessive compulsive disorder)     Panic disorder      Past Surgical History:   Procedure Laterality Date    APPENDECTOMY      ENDOMETRIAL ABLATION      age 14    TOENAIL EXCISION Bilateral 2024    VAGINAL HYSTERECTOMY      2019     Family History   Problem Relation Age of Onset    Breast cancer Mother     Alcohol abuse Mother     Anxiety disorder Mother     Asthma Mother     Depression Mother     Drug abuse Mother     Mental illness Mother     Miscarriages / Stillbirths Mother     Heart disease Father     Diabetes Father     Cancer Maternal Grandfather      Social History     Socioeconomic History    Marital status:    Tobacco Use    Smoking status: Some Days     Current packs/day: 0.25     Average  packs/day: 0.3 packs/day for 15.0 years (3.8 ttl pk-yrs)     Types: Cigarettes    Smokeless tobacco: Never    Tobacco comments:     Off and on since i was 13.   Vaping Use    Vaping status: Some Days    Substances: Nicotine, Flavoring    Devices: Disposable   Substance and Sexual Activity    Alcohol use: Not Currently    Drug use: Not Currently     Types: Cocaine(coke), Marijuana, Methamphetamines     Comment: [place] - occassional use    Sexual activity: Yes     Partners: Male     Birth control/protection: Hysterectomy     Current Outpatient Medications:     albuterol sulfate  (90 Base) MCG/ACT inhaler, Inhale 2 puffs Every 6 (Six) Hours As Needed for Wheezing or Shortness of Air., Disp: 18 g, Rfl: 5    ALPRAZolam (XANAX) 2 MG tablet, Take 0.5 tablets by mouth 3 (Three) Times a Day As Needed for Anxiety., Disp: , Rfl:     amphetamine-dextroamphetamine (ADDERALL) 20 MG tablet, Take 1 tablet by mouth Daily., Disp: , Rfl:     Cariprazine HCl (Vraylar) 1.5 MG capsule capsule, Take 1 capsule by mouth Daily., Disp: , Rfl:     Chlorhexidine Gluconate 4 % solution, Apply 1 Application topically Daily As Needed (folliculitis)., Disp: 3785 mL, Rfl: 5    Fluticasone-Salmeterol (Advair Diskus) 100-50 MCG/ACT DISKUS, Inhale 1 puff 2 (Two) Times a Day., Disp: 60 each, Rfl: 5    lisdexamfetamine (Vyvanse) 60 MG capsule, Take 1 capsule by mouth Every Morning, Disp: , Rfl:     Multiple Vitamins-Minerals (WOMENS MULTI PO), Take 1 tablet by mouth Daily., Disp: , Rfl:     Plecanatide (Trulance) 3 MG tablet, TAKE 1 TABLET BY MOUTH DAILY, Disp: 90 tablet, Rfl: 3    Jowuhwth-Wsgphnpfi-Xmnuwamaai (Vosevi) 400-100-100 MG tablet, Take 1 tablet by mouth Daily., Disp: 28 tablet, Rfl: 2    Vivitrol 380 MG reconstituted suspension IM suspension, Inject 380 mg into the appropriate muscle as directed by prescriber Every 30 (Thirty) Days., Disp: , Rfl:     No Known Allergies    The following portions of the patient's history were reviewed and  updated as appropriate: allergies, current medications, past family history, past medical history, past social history, past surgical history and problem list.    Objective     Physical Exam  Constitutional:       General: She is not in acute distress.     Appearance: Normal appearance. She is well-developed. She is not diaphoretic.   HENT:      Head: Normocephalic and atraumatic.      Right Ear: External ear normal.      Left Ear: External ear normal.      Nose: Nose normal.   Eyes:      General: No scleral icterus.        Right eye: No discharge.         Left eye: No discharge.      Conjunctiva/sclera: Conjunctivae normal.   Neck:      Trachea: No tracheal deviation.   Pulmonary:      Effort: Pulmonary effort is normal. No respiratory distress.   Musculoskeletal:         General: Normal range of motion.      Cervical back: Normal range of motion.   Skin:     Coloration: Skin is not pale.      Findings: No erythema or rash.      Comments: tattoos   Neurological:      Mental Status: She is alert and oriented to person, place, and time.      Coordination: Coordination normal.   Psychiatric:         Mood and Affect: Mood normal.         Behavior: Behavior normal.         Thought Content: Thought content normal.         Judgment: Judgment normal.       Vitals:    12/13/24 0934   BP: 130/96   Pulse: 95   Temp: 98.7 °F (37.1 °C)   SpO2: 93%   Weight: 107 kg (236 lb 6.4 oz)     Results Review:   I reviewed the patient's new clinical results.    Lab on 11/08/2024   Component Date Value Ref Range Status    HIV-1/ HIV-2 Ab 11/08/2024 Non-Reactive  Non-Reactive Final    HIV-1 P24 Ag Screen 11/08/2024 Non-Reactive  Non-Reactive Final    Fibrosis Score 11/08/2024 0.29 (H)  0.00 - 0.21 Final    Fibrosis Stage 11/08/2024 Comment   Final                     F1 - Portal fibrosis    Necroinflammat Activity Score 11/08/2024 0.90 (H)  0.00 - 0.17 Final    Necroinflammat Activity Grade 11/08/2024 A3-Severe activity   Final     Methodology: 11/08/2024 Comment   Final    The analytes tested are performed by [employer] methods.  Not intended for use with other diagnostic considerations.    Alpha 2-Macroglobulins, Qn 11/08/2024 333 (H)  110 - 276 mg/dL Final    Haptoglobin 11/08/2024 78  33 - 278 mg/dL Final    Apolipoprotein A-1 11/08/2024 140  116 - 209 mg/dL Final    Total Bilirubin 11/08/2024 0.3  0.0 - 1.2 mg/dL Final    GGT 11/08/2024 41  0 - 60 IU/L Final    ALT (SGPT) 11/08/2024 311 (H)  0 - 40 IU/L Final    Hepatitis C Quantitation 11/08/2024 [id number]  IU/mL Final    HCV log10 11/08/2024 5.607  log10 IU/mL Final    HCV Test Information 11/08/2024 Comment   Final    The quantitative range of this assay is 15 IU/mL to 100 million IU/mL.    HCV Genotype 11/08/2024 Comment   Final    To be performed on this specimen.    Hep A Total Ab 11/08/2024 Negative  Negative Final    Comment: The HAV total antibody assay detects both IgG and IgM  but does not differentiate between them. A negative result  suggests susceptibility to infection. A positive result could  be due to vaccination, previously resolved infection or active  infection. Testing for HAV IgM should be performed if active  HAV infection is suspected. [employer] offers profiles that will  automatically reflex positive HAV total antibody results to  IgM (e.g., panel #[id number] HAV Antibody w/ Rfx).    Hep B Core Total Ab 11/08/2024 Negative  Negative Final    Hep B S Ab 11/08/2024 Non-Reactive   Final    Hepatitis B Surface Ag 11/08/2024 Non-Reactive  Non-Reactive Final    Protime 11/08/2024 12.5  12.3 - 15.1 Seconds Final    INR 11/08/2024 0.89 (L)  0.90 - 1.10 Final    Glucose 11/08/2024 83  65 - 99 mg/dL Final    BUN 11/08/2024 12  6 - 20 mg/dL Final    Creatinine 11/08/2024 0.81  0.57 - 1.00 mg/dL Final    Sodium 11/08/2024 139  136 - 145 mmol/L Final    Potassium 11/08/2024 4.7  3.5 - 5.2 mmol/L Final    Chloride 11/08/2024 102  98 - 107 mmol/L Final    CO2 11/08/2024 25.5   22.0 - 29.0 mmol/L Final    Calcium 11/08/2024 10.6 (H)  8.6 - 10.5 mg/dL Final    Total Protein 11/08/2024 7.1  6.0 - 8.5 g/dL Final    Albumin 11/08/2024 4.2  3.5 - 5.2 g/dL Final    ALT (SGPT) 11/08/2024 232 (H)  1 - 33 U/L Final    AST (SGOT) 11/08/2024 111 (H)  1 - 32 U/L Final    Alkaline Phosphatase 11/08/2024 84  39 - 117 U/L Final    Total Bilirubin 11/08/2024 0.3  0.0 - 1.2 mg/dL Final    Globulin 11/08/2024 2.9  gm/dL Final    A/G Ratio 11/08/2024 1.4  g/dL Final    BUN/Creatinine Ratio 11/08/2024 14.8  7.0 - 25.0 Final    Anion Gap 11/08/2024 11.5  5.0 - 15.0 mmol/L Final    eGFR 11/08/2024 97.8  >60.0 mL/min/1.73 Final    Hepatitis C Genotype 11/08/2024 3   Final      No radiology results for the last 90 days.     Assessment / Plan      1. Chronic hepatitis C without hepatic coma  2. Elevated liver enzymes  She has chronic Hepatitis C infection, genotype 3, treatment experienced (took Epclusa (sofosbuvir/velpatasvir) x only 1 month, without cirrhosis (F1). She has been taking Vosevi 1 tab PO once daily for the past 4 weeks for Hep C therapy. She will continue this medication for total duration of therapy of 12 weeks. She will continue to take this medication at the same time every day without missing any doses. Hep C viral load lab (HCV RNA quant) and CMP will be checked today which is approx 4 weeks after start of therapy. Will have labs again at end of therapy and final labs 3 months s/p therapy to determine response to treatment and cure. She will continue to abstain from alcohol use at this time and agrees not to use illegal drugs. She understands to avoid any high risk behavior to prevent any reinfection during treatment and after treatment. Did have elevated liver enzymes prior to starting treatment with AST 11 and  11/8/2024, will monitor throughout treatment. She is not immune to Hep A or B and needs vaccination series for both.     Continue Vosevi  Labs today    - Hepatitis C RNA,  Quantitative, PCR (graph); Future  - Comprehensive Metabolic Panel; Future    Follow Up:   Return in about 5 months (around 5/13/2025) for recheck Hepatitis C.    Yulia Lundberg PA-C  [facility] [place]  12/13/2024  17:19 EST    Dictated Utilizing Dragon Dictation: Part of this note may be an electronic transcription/translation of spoken language to printed text using the Dragon Dictation System.

## 2024-12-13 NOTE — LETTER
December 13, 2024     TRISTON Casillas  [address] [address]    Patient: Selma Florez   YOB: 1990   Date of Visit: 12/13/2024       Dear TRISTON Casillas,    Thank you for referring Selma Florez to me for evaluation. Below is a copy of my consult note.    If you have questions, please do not hesitate to call me. I look forward to following Selma along with you.         Sincerely,        PHARMACIST [facility]                                   [facility]  Smoking Cessation Services       Patient self-referred herself to the [facility] to receive smoking cessation services. Initial consultation conducted today with PharmD.    Past Medical History:   Diagnosis Date   • ADHD    • Asthma    • Coma     2020 or 2021   • Hepatitis C    • Irritable bowel syndrome    • Obesity    • OCD (obsessive compulsive disorder)    • Panic disorder      No Known Allergies  Current Outpatient Medications   Medication Sig Dispense Refill   • albuterol sulfate  (90 Base) MCG/ACT inhaler Inhale 2 puffs Every 6 (Six) Hours As Needed for Wheezing or Shortness of Air. 18 g 5   • ALPRAZolam (XANAX) 2 MG tablet Take 0.5 tablets by mouth 3 (Three) Times a Day As Needed for Anxiety.     • amphetamine-dextroamphetamine (ADDERALL) 20 MG tablet Take 1 tablet by mouth Daily.     • Cariprazine HCl (Vraylar) 1.5 MG capsule capsule Take 1 capsule by mouth Daily.     • Chlorhexidine Gluconate 4 % solution Apply 1 Application topically Daily As Needed (folliculitis). 3785 mL 5   • Fluticasone-Salmeterol (Advair Diskus) 100-50 MCG/ACT DISKUS Inhale 1 puff 2 (Two) Times a Day. 60 each 5   • lisdexamfetamine (Vyvanse) 60 MG capsule Take 1 capsule by mouth Every Morning     • Multiple Vitamins-Minerals (WOMENS MULTI PO) Take 1 tablet by mouth Daily.     • nicotine polacrilex (NICORETTE) 4 MG gum Chew 1 piece every 1-2 hours as needed for nicotine  cravings 50 each 0   • Plecanatide (Trulance) 3 MG tablet TAKE 1 TABLET BY MOUTH DAILY 90 tablet 3   • Zoumiavm-Lexitjhmf-Skhzyqyuog (Vosevi) 400-100-100 MG tablet Take 1 tablet by mouth Daily. 28 tablet 2   • Vivitrol 380 MG reconstituted suspension IM suspension Inject 380 mg into the appropriate muscle as directed by prescriber Every 30 (Thirty) Days.       No current facility-administered medications for this visit.         Assessment:    Tobacco History Evaluation:  Years of tobacco use: 15  Average number of cigarettes or other tobacco/nicotine products per day: ~1-2 cigarettes per day but uses vape pen often  Number of Previous Quit Attempts: 1-2 times   Previous tobacco cessation medication attempts, failures, intolerances: Nicotine patches--causes welt on her arm  Other recreational substance use: None   1st cigarette of the day smoked: As soon as she wakes up     Trigger Evaluation: After meals     Evaluation of Contraindications: None    Fagerstrom Test Score:  How soon after you wake do you smoke your first cigarette? Within 5 minutes (3 points)  5-30 minutes (2 points)  31-60 minutes (1 point)   2. Do you find it difficult to refrain from smoking in places where its forbidden, such as the library, Buddhism, or theater? Yes (1 point)  No (0 points)   3. Which cigarette would you hate to give up most? The first one in the morning (1 point)  Any other (0 points)   4. How many cigarettes a day do you smoke? 10 or less (0 points)  11-20 (1 point)  21-30 (2 points)  31 or more (3 points)   5. Do you smoke more frequently during the first hours? Yes (1 point)  No (0 points)   6. Do you smoke when you are so ill that you can't get out of bed? Yes (1 point)  No (0 points)   Scorin  1-2 = Low Dependence: [May not need NRT] Patch: 7mg daily; Lozenge 2mg; Gum 2mg   3-4 = Low to Moderate Dependence: Patch 14mg daily; Inhaler: 6-12 cartridges daily; Lozenge 2mg daily; Gum 2mg daily   5-7 = Moderate Dependence:  Patch 21mg daily; Inhaler: 6-12 cartridges daily; Lozenge 4mg; Gum 4mg   8+ = High Dependence: Patch 21mg daily; Inhaler 6-12 cartidges daily; Lozenge 4mg daily; Gum 4mg daily     Readiness to quit:   Stage 1: Not ready to quit in the next month  Stage 2: Ready to quit in the next month  Stage 3: Recent quitter, quit within the past 6 months   Stage 4: Former tobacco user, quit > 6 months ago    Plan:     1. Quit Date Planned: 24  2. Behavioral options for quitting reviewed such as using a cut off straw to substitute for a cigarette to help satisfy hand oral fixation, she will work on crafts when she becomes bored, and will chew gum or candy when she begins craving a cigarette.   3. Provided additional resources to help with tobacco cessation. Provided patient with benefits of quitting handout.    4. Medication options reviewed.  Risks, benefits, and side effects discussed and questions answered.   5. Education was provided regarding: motivation to cease tobacco use, drug information on the medication dispensed (including directions for use and adverse effects), nicotine withdrawal symptoms, lifestyle modifications and techniques to prevent relapse.   6. Pursuant to the [place] Board of Pharmacy Approved Protocol,  will order: Nicotine 4 mg gum with directions to chew 1 piece every 1-2 hours as needed for nicotine cravings with a max of 20 pieces per day.   7. Will notify the patient's PCP, Willa Talavera.  8. Will follow up with patient one week after quit date,       Anisa Gonzales [facility]  24  12:34 EST    Time spent providing smoking cessation counselin minutes

## 2024-12-13 NOTE — LETTER
2024     TRISTON Casillas  [address]  [place] [address]    Patient: Selma Florez   YOB: 1990   Date of Visit: 2024       Dear TRISTON Casillas    Selma Florez was in my office today. Below is a copy of my note.    If you have questions, please do not hesitate to call me. I look forward to following Selma along with you.         Sincerely,        [facility]        CC: No Recipients         Follow Up Note     Date: 2024   Patient Name: Selma Florez  MRN: [id number]  : 1990     Primary Care Provider: Porsche Talavera APRN     Chief Complaint   Patient presents with   • Hepatitis C     Pt here for 4 week follow up on Vosevi     History of present illness:   2024  Selma Florez is a 34 y.o. female who is here today for follow up regarding Hepatitis C.    Has been taking Vosevi 1 tab PO once daily for the past approx 4 weeks for Hep C therapy. No missed doses. Has noticed some increased fatigue but that seems to be improving now. Also trying to stop smoking.    Interval History:  2024  She started Hep C treatment with Epclusa from Aug 2024 and took the medication for 5 weeks. She was not able to complete treatment when she left the rehab. She had severe migraines and vomiting during the treatment. Has been clean now since 2024. She is on vivotrol now.      Feeling well today without GI complaints. Takes Linzess 72 mcg once daily for constipation with good improvements.     Subjective     Past Medical History:   Diagnosis Date   • ADHD    • Asthma    • Coma      or    • Hepatitis C    • Irritable bowel syndrome    • Obesity    • OCD (obsessive compulsive disorder)    • Panic disorder      Past Surgical History:   Procedure Laterality Date   • APPENDECTOMY     • ENDOMETRIAL ABLATION      age 14   • TOENAIL EXCISION Bilateral 2024   • VAGINAL HYSTERECTOMY      2019     Family  History   Problem Relation Age of Onset   • Breast cancer Mother    • Alcohol abuse Mother    • Anxiety disorder Mother    • Asthma Mother    • Depression Mother    • Drug abuse Mother    • Mental illness Mother    • Miscarriages / Stillbirths Mother    • Heart disease Father    • Diabetes Father    • Cancer Maternal Grandfather      Social History     Socioeconomic History   • Marital status:    Tobacco Use   • Smoking status: Some Days     Current packs/day: 0.25     Average packs/day: 0.3 packs/day for 15.0 years (3.8 ttl pk-yrs)     Types: Cigarettes   • Smokeless tobacco: Never   • Tobacco comments:     Off and on since i was 13.   Vaping Use   • Vaping status: Some Days   • Substances: Nicotine, Flavoring   • Devices: Disposable   Substance and Sexual Activity   • Alcohol use: Not Currently   • Drug use: Not Currently     Types: Cocaine(coke), Marijuana, Methamphetamines     Comment: Montserrat - occassional use   • Sexual activity: Yes     Partners: Male     Birth control/protection: Hysterectomy     Current Outpatient Medications:   •  albuterol sulfate  (90 Base) MCG/ACT inhaler, Inhale 2 puffs Every 6 (Six) Hours As Needed for Wheezing or Shortness of Air., Disp: 18 g, Rfl: 5  •  ALPRAZolam (XANAX) 2 MG tablet, Take 0.5 tablets by mouth 3 (Three) Times a Day As Needed for Anxiety., Disp: , Rfl:   •  amphetamine-dextroamphetamine (ADDERALL) 20 MG tablet, Take 1 tablet by mouth Daily., Disp: , Rfl:   •  Cariprazine HCl (Vraylar) 1.5 MG capsule capsule, Take 1 capsule by mouth Daily., Disp: , Rfl:   •  Chlorhexidine Gluconate 4 % solution, Apply 1 Application topically Daily As Needed (folliculitis)., Disp: 3785 mL, Rfl: 5  •  Fluticasone-Salmeterol (Advair Diskus) 100-50 MCG/ACT DISKUS, Inhale 1 puff 2 (Two) Times a Day., Disp: 60 each, Rfl: 5  •  lisdexamfetamine (Vyvanse) 60 MG capsule, Take 1 capsule by mouth Every Morning, Disp: , Rfl:   •  Multiple Vitamins-Minerals (WOMENS MULTI PO), Take 1  tablet by mouth Daily., Disp: , Rfl:   •  Plecanatide (Trulance) 3 MG tablet, TAKE 1 TABLET BY MOUTH DAILY, Disp: 90 tablet, Rfl: 3  •  Dfholmhy-Lkmdwuusa-Lcfcungyot (Vosevi) 400-100-100 MG tablet, Take 1 tablet by mouth Daily., Disp: 28 tablet, Rfl: 2  •  Vivitrol 380 MG reconstituted suspension IM suspension, Inject 380 mg into the appropriate muscle as directed by prescriber Every 30 (Thirty) Days., Disp: , Rfl:     No Known Allergies    The following portions of the patient's history were reviewed and updated as appropriate: allergies, current medications, past family history, past medical history, past social history, past surgical history and problem list.    Objective     Physical Exam  Constitutional:       General: She is not in acute distress.     Appearance: Normal appearance. She is well-developed. She is not diaphoretic.   HENT:      Head: Normocephalic and atraumatic.      Right Ear: External ear normal.      Left Ear: External ear normal.      Nose: Nose normal.   Eyes:      General: No scleral icterus.        Right eye: No discharge.         Left eye: No discharge.      Conjunctiva/sclera: Conjunctivae normal.   Neck:      Trachea: No tracheal deviation.   Pulmonary:      Effort: Pulmonary effort is normal. No respiratory distress.   Musculoskeletal:         General: Normal range of motion.      Cervical back: Normal range of motion.   Skin:     Coloration: Skin is not pale.      Findings: No erythema or rash.      Comments: tattoos   Neurological:      Mental Status: She is alert and oriented to person, place, and time.      Coordination: Coordination normal.   Psychiatric:         Mood and Affect: Mood normal.         Behavior: Behavior normal.         Thought Content: Thought content normal.         Judgment: Judgment normal.       Vitals:    12/13/24 0934   BP: 130/96   Pulse: 95   Temp: 98.7 °F (37.1 °C)   SpO2: 93%   Weight: 107 kg (236 lb 6.4 oz)     Results Review:   I reviewed the patient's  new clinical results.    Lab on 11/08/2024   Component Date Value Ref Range Status   • HIV-1/ HIV-2 Ab 11/08/2024 Non-Reactive  Non-Reactive Final   • HIV-1 P24 Ag Screen 11/08/2024 Non-Reactive  Non-Reactive Final   • Fibrosis Score 11/08/2024 0.29 (H)  0.00 - 0.21 Final   • Fibrosis Stage 11/08/2024 Comment   Final                     F1 - Portal fibrosis   • Necroinflammat Activity Score 11/08/2024 0.90 (H)  0.00 - 0.17 Final   • Necroinflammat Activity Grade 11/08/2024 A3-Severe activity   Final   • Methodology: 11/08/2024 Comment   Final    The analytes tested are performed by [employer]-Specific methods.  Not intended for use with other diagnostic considerations.   • Alpha 2-Macroglobulins, Qn 11/08/2024 333 (H)  110 - 276 mg/dL Final   • Haptoglobin 11/08/2024 78  33 - 278 mg/dL Final   • Apolipoprotein A-1 11/08/2024 140  116 - 209 mg/dL Final   • Total Bilirubin 11/08/2024 0.3  0.0 - 1.2 mg/dL Final   • GGT 11/08/2024 41  0 - 60 IU/L Final   • ALT (SGPT) 11/08/2024 311 (H)  0 - 40 IU/L Final   • Hepatitis C Quantitation 11/08/2024 [id number]  IU/mL Final   • HCV log10 11/08/2024 5.607  log10 IU/mL Final   • HCV Test Information 11/08/2024 Comment   Final    The quantitative range of this assay is 15 IU/mL to 100 million IU/mL.   • HCV Genotype 11/08/2024 Comment   Final    To be performed on this specimen.   • Hep A Total Ab 11/08/2024 Negative  Negative Final    Comment: The HAV total antibody assay detects both IgG and IgM  but does not differentiate between them. A negative result  suggests susceptibility to infection. A positive result could  be due to vaccination, previously resolved infection or active  infection. Testing for HAV IgM should be performed if active  HAV infection is suspected. [facility] offers profiles that will  automatically reflex positive HAV total antibody results to  IgM (e.g., panel #[id number] HAV Antibody w/ Rfx).   • Hep B Core Total Ab 11/08/2024 Negative  Negative Final   • Hep B S Ab  11/08/2024 Non-Reactive   Final   • Hepatitis B Surface Ag 11/08/2024 Non-Reactive  Non-Reactive Final   • Protime 11/08/2024 12.5  12.3 - 15.1 Seconds Final   • INR 11/08/2024 0.89 (L)  0.90 - 1.10 Final   • Glucose 11/08/2024 83  65 - 99 mg/dL Final   • BUN 11/08/2024 12  6 - 20 mg/dL Final   • Creatinine 11/08/2024 0.81  0.57 - 1.00 mg/dL Final   • Sodium 11/08/2024 139  136 - 145 mmol/L Final   • Potassium 11/08/2024 4.7  3.5 - 5.2 mmol/L Final   • Chloride 11/08/2024 102  98 - 107 mmol/L Final   • CO2 11/08/2024 25.5  22.0 - 29.0 mmol/L Final   • Calcium 11/08/2024 10.6 (H)  8.6 - 10.5 mg/dL Final   • Total Protein 11/08/2024 7.1  6.0 - 8.5 g/dL Final   • Albumin 11/08/2024 4.2  3.5 - 5.2 g/dL Final   • ALT (SGPT) 11/08/2024 232 (H)  1 - 33 U/L Final   • AST (SGOT) 11/08/2024 111 (H)  1 - 32 U/L Final   • Alkaline Phosphatase 11/08/2024 84  39 - 117 U/L Final   • Total Bilirubin 11/08/2024 0.3  0.0 - 1.2 mg/dL Final   • Globulin 11/08/2024 2.9  gm/dL Final   • A/G Ratio 11/08/2024 1.4  g/dL Final   • BUN/Creatinine Ratio 11/08/2024 14.8  7.0 - 25.0 Final   • Anion Gap 11/08/2024 11.5  5.0 - 15.0 mmol/L Final   • eGFR 11/08/2024 97.8  >60.0 mL/min/1.73 Final   • Hepatitis C Genotype 11/08/2024 3   Final      No radiology results for the last 90 days.     Assessment / Plan      1. Chronic hepatitis C without hepatic coma  2. Elevated liver enzymes  She has chronic Hepatitis C infection, genotype 3, treatment experienced (took Epclusa (sofosbuvir/velpatasvir) x only 1 month, without cirrhosis (F1). She has been taking Vosevi 1 tab PO once daily for the past 4 weeks for Hep C therapy. She will continue this medication for total duration of therapy of 12 weeks. She will continue to take this medication at the same time every day without missing any doses. Hep C viral load lab (HCV RNA quant) and CMP will be checked today which is approx 4 weeks after start of therapy. Will have labs again at end of therapy and final  labs 3 months s/p therapy to determine response to treatment and cure. She will continue to abstain from alcohol use at this time and agrees not to use illegal drugs. She understands to avoid any high risk behavior to prevent any reinfection during treatment and after treatment. Did have elevated liver enzymes prior to starting treatment with AST 11 and  11/8/2024, will monitor throughout treatment. She is not immune to Hep A or B and needs vaccination series for both.     Continue Vosevi  Labs today    - Hepatitis C RNA, Quantitative, PCR (graph); Future  - Comprehensive Metabolic Panel; Future    Follow Up:   Return in about 5 months (around 5/13/2025) for recheck Hepatitis C.    Yulia Lundberg PA-C  [facility] [place]  12/13/2024  17:19 EST    Dictated Utilizing Dragon Dictation: Part of this note may be an electronic transcription/translation of spoken language to printed text using the Dragon Dictation System.

## 2024-12-16 LAB
HCV RNA SERPL NAA+PROBE-ACNC: 30 IU/ML
HCV RNA SERPL NAA+PROBE-LOG IU: 1.48 LOG10 IU/ML
REF LAB TEST REF RANGE: NORMAL

## 2024-12-18 ENCOUNTER — SPECIALTY PHARMACY (OUTPATIENT)
Dept: PHARMACY | Facility: HOSPITAL | Age: 34
End: 2024-12-18
Payer: COMMERCIAL

## 2024-12-18 ENCOUNTER — TELEPHONE (OUTPATIENT)
Dept: GASTROENTEROLOGY | Facility: CLINIC | Age: 34
End: 2024-12-18
Payer: COMMERCIAL

## 2024-12-18 DIAGNOSIS — B18.2 CHRONIC HEPATITIS C WITHOUT HEPATIC COMA: Primary | ICD-10-CM

## 2024-12-18 NOTE — PROGRESS NOTES
Specialty Pharmacy Refill Coordination Note     Selma is a 34 y.o. female contacted today regarding refills of  VOSEVI specialty medication(s).    Reviewed and verified with patient: NO  Specialty medication(s) and dose(s) confirmed: yes          PT PICKED UP VOSEVI FROM PHARMACY ON 12/17.         Follow-up: 4 week(s)     Linda Tijerina MA  Specialty Pharmacy Technician

## 2024-12-19 ENCOUNTER — CLINICAL SUPPORT (OUTPATIENT)
Dept: INTERNAL MEDICINE | Facility: CLINIC | Age: 34
End: 2024-12-19
Payer: COMMERCIAL

## 2024-12-26 NOTE — TELEPHONE ENCOUNTER
I would not expect any correlation with decreased affect of the alprazolam and her vosevi. I would recommend she discuss with the prescriber of her anxiety meds. Please make sure she is staying adequately hydrated.   
Notified patient HCV RNA now at 30 from previous 405,000. She will continue taking Vosevi as directed and will repeat labs at the end of treatment.    Patient wanted to ask about her prescription for Alprazolam. She takes it for anxiety and sleeplessness but has noticed it isn't working as well since starting Vosevi. Patient wants to know if this is a side effect and if so, if she should discontinue the Alprazolam until completion of Vosevi?    LORENZO Mckeon  
She has been taking Vosevi for the past 4 weeks for Hep C therapy. Labs show HCV RNA significantly decreased from 405,000 to now 30. Continue medication as prescribed. Have labs again at end of treatment. Please let her know.   
Unable to reach patient, left vm  
Universal Safety Interventions

## 2024-12-27 ENCOUNTER — SPECIALTY PHARMACY (OUTPATIENT)
Dept: PHARMACY | Facility: HOSPITAL | Age: 34
End: 2024-12-27
Payer: COMMERCIAL

## 2024-12-27 NOTE — PROGRESS NOTES
[facility]  Specialty Pharmacy Patient Management Program  Hepatitis C Reassessment     Selma Florez was [facility] by their provider [facility] for [facility] treatment program. A follow-up outreach was conducted, including assessment of continued therapy appropriateness, medication adherence, and side effect incidence and management for Vosevi.     Patient had a 4-week follow-up in clinic on 12/13/24 and stated that she is taking Vosevi as directed. Her labs completed on day of appointment showed a large improvement of HCV RNA from 405,000 IU/mL to 30 IU/mL. Patient stated that she did notice some increased fatigue when she first started Vosevi but that has improved now. She reported no missed doses and picked up refill from pharmacy on 12/17/24.    Changes to Insurance Coverage or Financial Support  No changes to insurance     Relevant Past Medical History and Comorbidities  Relevant medical history and concomitant health conditions were discussed with the patient. The patient's chart has been reviewed for relevant past medical history and comorbid health conditions and updated as necessary.   Past Medical History:   Diagnosis Date    ADHD     Asthma     Coma     2020 or 2021    Hepatitis C     Irritable bowel syndrome     Obesity     OCD (obsessive compulsive disorder)     Panic disorder      Social History     Socioeconomic History    Marital status:    Tobacco Use    Smoking status: Some Days     Current packs/day: 0.25     Average packs/day: 0.3 packs/day for 15.0 years (3.8 ttl pk-yrs)     Types: Cigarettes    Smokeless tobacco: Never    Tobacco comments:     Off and on since i was 13.   Vaping Use    Vaping status: Some Days    Substances: Nicotine, Flavoring    Devices: Disposable   Substance and Sexual Activity    Alcohol use: Not Currently    Drug use: Not Currently     Types: Cocaine(coke), Marijuana, Methamphetamines     Comment: Montserrat - occassional  use    Sexual activity: Yes     Partners: Male     Birth control/protection: Hysterectomy     Problem list reviewed by Anisa Gonzales RPH on 12/27/2024 at  1:21 PM    Hospitalizations and Urgent Care Since Last Assessment  ED Visits, Admissions, or Hospitalizations: None   Urgent Office Visits: None     Allergies  Known allergies and reactions were discussed with the patient. The patient's chart has been reviewed for allergy information and updated as necessary.   No Known Allergies  Allergies reviewed by Anisa Gonzales RPH on 12/27/2024 at  1:21 PM    Relevant Laboratory Values  Relevant laboratory values were discussed with the patient. The following specialty medication dose adjustment(s) are recommended:    11/18 HCV RNA: 692599 IU/mL  12/13 HCV RNA (4-week treatment labs): 30 IU/mL.     Lab Results   Component Value Date    GLUCOSE 89 12/13/2024    CALCIUM 9.3 12/13/2024     12/13/2024    K 4.6 12/13/2024    CO2 23.6 12/13/2024     12/13/2024    BUN 15 12/13/2024    CREATININE 0.87 12/13/2024    EGFRIFAFRI >60 06/10/2022    EGFRIFNONA >60 06/10/2022    BCR 17.2 12/13/2024    ANIONGAP 11.4 12/13/2024     Lab Results   Component Value Date    CHLPL 146 10/22/2024    TRIG 127 10/22/2024    HDL 38 (L) 10/22/2024    LDL 85 10/22/2024       Current Medication List  This medication list has been reviewed with the patient and evaluated for any interactions or necessary modifications/recommendations, and updated to include all prescription medications, OTC medications, and supplements the patient is currently taking.  This list reflects what is contained in the patient's profile, which has also been marked as reviewed to communicate to other providers it is the most up to date version of the patient's current medication therapy.     Current Outpatient Medications:     albuterol sulfate  (90 Base) MCG/ACT inhaler, Inhale 2 puffs Every 6 (Six) Hours As Needed for Wheezing or Shortness of Air.,  Disp: 18 g, Rfl: 5    ALPRAZolam (XANAX) 2 MG tablet, Take 0.5 tablets by mouth 3 (Three) Times a Day As Needed for Anxiety., Disp: , Rfl:     amphetamine-dextroamphetamine (ADDERALL) 20 MG tablet, Take 1 tablet by mouth Daily., Disp: , Rfl:     Cariprazine HCl (Vraylar) 1.5 MG capsule capsule, Take 1 capsule by mouth Daily., Disp: , Rfl:     Chlorhexidine Gluconate 4 % solution, Apply 1 Application topically Daily As Needed (folliculitis)., Disp: 3785 mL, Rfl: 5    Fluticasone-Salmeterol (Advair Diskus) 100-50 MCG/ACT DISKUS, Inhale 1 puff 2 (Two) Times a Day., Disp: 60 each, Rfl: 5    lisdexamfetamine (Vyvanse) 60 MG capsule, Take 1 capsule by mouth Every Morning, Disp: , Rfl:     Multiple Vitamins-Minerals (WOMENS MULTI PO), Take 1 tablet by mouth Daily., Disp: , Rfl:     nicotine polacrilex (NICORETTE) 4 MG gum, Chew 1 piece every 1-2 hours as needed for nicotine cravings, Disp: 50 each, Rfl: 0    Plecanatide (Trulance) 3 MG tablet, TAKE 1 TABLET BY MOUTH DAILY, Disp: 90 tablet, Rfl: 3    Yigihgen-Iqmgywoya-Ulsksnanjk (Vosevi) 400-100-100 MG tablet, Take 1 tablet by mouth Daily., Disp: 28 tablet, Rfl: 2    Vivitrol 380 MG reconstituted suspension IM suspension, Inject 380 mg into the appropriate muscle as directed by prescriber Every 30 (Thirty) Days., Disp: , Rfl:     Medicines reviewed by Anisa Gonzales RP on 12/27/2024 at  1:21 PM    Drug Interactions  None noted     Adverse Drug Reactions  Plan for ADR Management: No ADRs reported     Adherence, Self-Administration, and Current Therapy Problems  Adherence related to the patient's specialty therapy was discussed with the patient. The Adherence segment of this outreach has been reviewed and updated.     Additional Barriers to Patient Self-Administration: None   Methods for Supporting Patient Self-Administration: N/A    Open Medication Therapy Problems  No medication therapy recommendations to display    Goals of Therapy  Goals related to the patient's  specialty therapy were discussed with the patient. The Patient Goals segment of this outreach has been reviewed and updated.   Goals Addressed Today        Specialty Pharmacy General Goal      HCV RNA undetectable 12-weeks post treatment               Quality of Life Assessment   Quality of Life related to the patient's enrollment in the patient management program and services provided was discussed with the patient. The QOL segment of this outreach has been reviewed and updated.  Quality of Life Improvement Scale: 10-Significantly better    Reassessment Plan & Follow-Up  1. Medication Therapy Changes: No therapy changes recommended. Patient will continue Vosevi as directed and repeat HCV RNA labs at end of treatment in 8-weeks.   2. Related Plans, Therapy Recommendations, or Issues to Be Addressed: None   3. Pharmacist to repeat clinical assessment in 4-weeks.   4. Care Coordinator to set up future refill outreaches, coordinate prescription delivery, and escalate clinical questions to pharmacist.    Attestation  Therapeutic appropriateness: Appropriate   I attest the patient was actively involved in and has agreed to the above plan of care.  If the prescribed therapy is at any point deemed not appropriate based on the current or future assessments, a consultation will be initiated with the patient's specialty care provider to determine the best course of action. The revised plan of therapy will be documented along with any required assessments and/or additional patient education provided.     Anisa Gonzales, [place]  12/27/2024   13:25 EST

## 2024-12-28 ENCOUNTER — TELEMEDICINE (OUTPATIENT)
Dept: FAMILY MEDICINE CLINIC | Facility: TELEHEALTH | Age: 34
End: 2024-12-28
Payer: COMMERCIAL

## 2024-12-28 DIAGNOSIS — J22 LOWER RESPIRATORY INFECTION: Primary | ICD-10-CM

## 2024-12-28 PROBLEM — G47.00 INSOMNIA: Status: ACTIVE | Noted: 2024-12-28

## 2024-12-28 PROBLEM — F31.81 BIPOLAR II DISORDER: Status: ACTIVE | Noted: 2024-12-28

## 2024-12-28 RX ORDER — GUAIFENESIN 600 MG/1
600 TABLET, EXTENDED RELEASE ORAL 2 TIMES DAILY
Qty: 28 TABLET | Refills: 0 | Status: SHIPPED | OUTPATIENT
Start: 2024-12-28 | End: 2025-01-11

## 2024-12-28 RX ORDER — COVID-19 ANTIGEN TEST
1 KIT MISCELLANEOUS ONCE
Qty: 1 KIT | Refills: 0 | Status: SHIPPED | OUTPATIENT
Start: 2024-12-28 | End: 2024-12-28

## 2024-12-28 RX ORDER — DEXTROMETHORPHAN HYDROBROMIDE AND PROMETHAZINE HYDROCHLORIDE 15; 6.25 MG/5ML; MG/5ML
5 SYRUP ORAL NIGHTLY PRN
Qty: 118 ML | Refills: 0 | Status: SHIPPED | OUTPATIENT
Start: 2024-12-28

## 2024-12-28 RX ORDER — PREDNISONE 20 MG/1
TABLET ORAL
Qty: 13 TABLET | Refills: 0 | Status: SHIPPED | OUTPATIENT
Start: 2024-12-28

## 2024-12-28 RX ORDER — AZITHROMYCIN 250 MG/1
TABLET, FILM COATED ORAL
Qty: 6 TABLET | Refills: 0 | Status: SHIPPED | OUTPATIENT
Start: 2024-12-28

## 2024-12-28 NOTE — PROGRESS NOTES
Mode of Visit: Video  Location of patient: -HOME-  Location of provider: +HOME+  You have chosen to receive care through a telehealth visit.  The patient has signed the video visit consent form.  The visit included audio and video interaction. No technical issues occurred during this visit.    DEVON Florez is a 34 y.o. female  presents with complaint of cough, shortness of breath, congestion.  She reports that her cough is so bad that she does not want to cough because it burns.  All symptoms she is having are noted in the ROS portion of this visit.  She reports that she has been in ICU and in a coma from asthma in the past.  She has no known exposure to illness's but was in the podiatrist office which was full of people on 12/22/2024.  She then started feeling bad on 12/24 and by Caren she was really feeling bad.  She reports that her symptoms keep worsening.  Over-the-counter she has tried TheraFlu and Mucinex max for her symptoms. She has also been using her inhalers.     Review of Systems   Constitutional:  Positive for chills, fatigue and fever.   HENT:  Positive for congestion, postnasal drip, rhinorrhea, sinus pressure, sinus pain, sneezing and sore throat.    Respiratory:  Positive for cough, chest tightness, shortness of breath and wheezing.         Productive at times, burns with cough   Gastrointestinal:  Positive for diarrhea. Negative for nausea.   Musculoskeletal:  Positive for myalgias.   Neurological:  Positive for headaches.       Past Medical History:   Diagnosis Date    ADHD     Asthma     Coma     2020 or 2021    Hepatitis C     Irritable bowel syndrome     Obesity     OCD (obsessive compulsive disorder)     Panic disorder        Family History   Problem Relation Age of Onset    Breast cancer Mother     Alcohol abuse Mother     Anxiety disorder Mother     Asthma Mother     Depression Mother     Drug abuse Mother     Mental illness Mother     Miscarriages / Stillbirths Mother      Heart disease Father     Diabetes Father     Cancer Maternal Grandfather        Social History     Socioeconomic History    Marital status:    Tobacco Use    Smoking status: Some Days     Current packs/day: 0.25     Average packs/day: 0.3 packs/day for 15.0 years (3.8 ttl pk-yrs)     Types: Cigarettes    Smokeless tobacco: Never    Tobacco comments:     Off and on since i was 13.   Vaping Use    Vaping status: Some Days    Substances: Nicotine, Flavoring    Devices: Disposable   Substance and Sexual Activity    Alcohol use: Not Currently    Drug use: Not Currently     Types: Cocaine(coke), Marijuana, Methamphetamines     Comment: [place] - occassional use    Sexual activity: Yes     Partners: Male     Birth control/protection: Hysterectomy       Selma Florez  reports that she has been smoking cigarettes. She has a 3.8 pack-year smoking history. She has never used smokeless tobacco. I have educated her on the risk of diseases from using tobacco products such as cancer, COPD, and heart disease.     I advised her to quit and she is willing to quit. We have discussed the following method/s for tobacco cessation:  Counseling.  Together we have set a quit date for  01/03/2024 .  She will follow up with her primary care provider.      Breastfeeding No     PHYSICAL EXAM  Physical Exam   Constitutional: She is oriented to person, place, and time. She appears well-developed. She appears ill.   HENT:   Head: Normocephalic and atraumatic.   Nose: Rhinorrhea and congestion present. Right sinus exhibits maxillary sinus tenderness (Patient directed to) and frontal sinus tenderness (Patient directed exam). Left sinus exhibits maxillary sinus tenderness (Patient directed exam) and frontal sinus tenderness (Patient directed exam).   Eyes: Lids are normal. Right eye exhibits no discharge. Left eye exhibits no discharge. Right conjunctiva is not injected. Left conjunctiva is not injected.   Pulmonary/Chest:  No  respiratory distress.  Neurological: She is alert and oriented to person, place, and time. No cranial nerve deficit.   Psychiatric: She has a normal mood and affect. Her speech is normal and behavior is normal. Judgment and thought content normal.       Results for orders placed or performed in visit on 12/13/24   Hepatitis C RNA, Quantitative, PCR (graph)    Collection Time: 12/13/24 10:32 AM    Specimen: Blood   Result Value Ref Range    Hepatitis C Quantitation 30 IU/mL    HCV log10 1.477 log10 IU/mL    Test Information Comment    Comprehensive Metabolic Panel    Collection Time: 12/13/24 10:32 AM    Specimen: Blood   Result Value Ref Range    Glucose 89 65 - 99 mg/dL    BUN 15 6 - 20 mg/dL    Creatinine 0.87 0.57 - 1.00 mg/dL    Sodium 139 136 - 145 mmol/L    Potassium 4.6 3.5 - 5.2 mmol/L    Chloride 104 98 - 107 mmol/L    CO2 23.6 22.0 - 29.0 mmol/L    Calcium 9.3 8.6 - 10.5 mg/dL    Total Protein 7.7 6.0 - 8.5 g/dL    Albumin 3.9 3.5 - 5.2 g/dL    ALT (SGPT) 28 1 - 33 U/L    AST (SGOT) 22 1 - 32 U/L    Alkaline Phosphatase 76 39 - 117 U/L    Total Bilirubin 0.5 0.0 - 1.2 mg/dL    Globulin 3.8 gm/dL    A/G Ratio 1.0 g/dL    BUN/Creatinine Ratio 17.2 7.0 - 25.0    Anion Gap 11.4 5.0 - 15.0 mmol/L    eGFR 89.8 >60.0 mL/min/1.73       Diagnoses and all orders for this visit:    1. Lower respiratory infection (Primary)    Other orders  -     azithromycin (Zithromax) 250 MG tablet; Take 2 tablets the first day, then 1 tablet daily for 4 days.  Dispense: 6 tablet; Refill: 0  -     predniSONE (DELTASONE) 20 MG tablet; Prednisone 20mg tabs, 3 for 2 days, 2 for 2 days, 1 for 2 days, 1/2 for 2 days take with food or milk  Dispense: 13 tablet; Refill: 0  -     promethazine-dextromethorphan (PROMETHAZINE-DM) 6.25-15 MG/5ML syrup; Take 5 mL by mouth At Night As Needed for Cough.  Dispense: 118 mL; Refill: 0  -     guaiFENesin (Mucinex) 600 MG 12 hr tablet; Take 1 tablet by mouth 2 (Two) Times a Day for 14 days.  Dispense:  28 tablet; Refill: 0  -     COVID-19 At Home Antigen Test (BinaxNOW COVID-19 Ag Home Test) kit; 1 kit by In Vitro route 1 (One) Time for 1 dose.  Dispense: 1 kit; Refill: 0    Risk and benefits of azithromycin discussed with patient  Probiotics for two weeks related to taking antibiotics. The pharmacist can help you with this if needed. Do not take within two hours of antibiotic.  Take prednisone with food as early in the day as possible  Do not take prednisone with nsaids such as ibuprofen, aleve, or aspirin  May take tylenol for pain or fever  Mucinex with plenty of fluids especially water to thin secretions to help clear chest congestion  Promethazine DM as needed for nighttime cough  Do not drive after taking promethazine DM as it may make you drowsy  Hydrate well  COVID tested advised    FOLLOW-UP  If symptoms worsen or persist follow up with PCP,  Urgent Care or ER depending on severity of symptoms    Patient verbalizes understanding of medication dosage, comfort measures, instructions for treatment and follow-up.    Phoebe Velasco, APRN  12/28/2024  09:01 EST    The use of a video visit has been reviewed with the patient and verbal informed consent has been obtained. Myself and Selma Florez participated in this visit. The patient is located in [address] [place] [address].    I am located in [place], [place]. [employer] were utilized. I spent 25 minutes in the patient's chart for this visit.

## 2024-12-30 ENCOUNTER — CLINICAL SUPPORT (OUTPATIENT)
Dept: INTERNAL MEDICINE | Facility: CLINIC | Age: 34
End: 2024-12-30
Payer: COMMERCIAL

## 2024-12-30 DIAGNOSIS — F19.10 SUBSTANCE ABUSE: ICD-10-CM

## 2024-12-30 DIAGNOSIS — F19.11 HISTORY OF DRUG ABUSE: ICD-10-CM

## 2024-12-30 DIAGNOSIS — Z02.83 ENCOUNTER FOR DRUG SCREENING: ICD-10-CM

## 2024-12-31 LAB
AMPHETAMINES UR QL SCN: POSITIVE NG/ML
BARBITURATES UR QL SCN: NEGATIVE NG/ML
BENZODIAZ UR QL SCN: POSITIVE NG/ML
BZE UR QL SCN: NEGATIVE NG/ML
CANNABINOIDS UR QL SCN: NEGATIVE NG/ML
CREAT UR-MCNC: 35 MG/DL (ref 20–300)
LABORATORY COMMENT REPORT: ABNORMAL
METHADONE UR QL SCN: NEGATIVE NG/ML
OPIATES UR QL SCN: NEGATIVE NG/ML
OXYCODONE+OXYMORPHONE UR QL SCN: NEGATIVE NG/ML
PCP UR QL: NEGATIVE NG/ML
PH UR: 6.6 [PH] (ref 4.5–8.9)
PROPOXYPH UR QL SCN: NEGATIVE NG/ML

## 2025-01-02 DIAGNOSIS — Z11.1 TUBERCULOSIS SCREENING: Primary | ICD-10-CM
